# Patient Record
Sex: MALE | Race: WHITE | NOT HISPANIC OR LATINO | Employment: OTHER | URBAN - METROPOLITAN AREA
[De-identification: names, ages, dates, MRNs, and addresses within clinical notes are randomized per-mention and may not be internally consistent; named-entity substitution may affect disease eponyms.]

---

## 2017-12-01 ENCOUNTER — OFFICE VISIT (OUTPATIENT)
Dept: FAMILY MEDICINE CLINIC | Facility: CLINIC | Age: 67
End: 2017-12-01

## 2017-12-01 VITALS
OXYGEN SATURATION: 96 % | DIASTOLIC BLOOD PRESSURE: 64 MMHG | SYSTOLIC BLOOD PRESSURE: 104 MMHG | WEIGHT: 208 LBS | HEART RATE: 55 BPM | TEMPERATURE: 98.2 F

## 2017-12-01 DIAGNOSIS — E78.79 FAMILIAL HYPERCHOLANEMIA: Primary | ICD-10-CM

## 2017-12-01 DIAGNOSIS — F51.01 PRIMARY INSOMNIA: ICD-10-CM

## 2017-12-01 DIAGNOSIS — E03.9 ADULT HYPOTHYROIDISM: ICD-10-CM

## 2017-12-01 PROBLEM — E78.00 HYPERCHOLESTEROLEMIA: Status: ACTIVE | Noted: 2017-12-01

## 2017-12-01 PROCEDURE — 99204 OFFICE O/P NEW MOD 45 MIN: CPT | Performed by: FAMILY MEDICINE

## 2017-12-01 RX ORDER — LEVOTHYROXINE SODIUM 0.15 MG/1
150 TABLET ORAL DAILY
COMMUNITY
End: 2018-07-06 | Stop reason: SDUPTHER

## 2017-12-01 RX ORDER — ZOLPIDEM TARTRATE 5 MG/1
TABLET ORAL
Qty: 60 TABLET | Refills: 2 | Status: SHIPPED | OUTPATIENT
Start: 2017-12-01 | End: 2018-10-09

## 2017-12-01 RX ORDER — SIMVASTATIN 20 MG
20 TABLET ORAL NIGHTLY
COMMUNITY
End: 2018-07-26 | Stop reason: SDUPTHER

## 2017-12-01 RX ORDER — ZOLPIDEM TARTRATE 10 MG/1
10 TABLET ORAL NIGHTLY PRN
COMMUNITY
End: 2017-12-01 | Stop reason: SDUPTHER

## 2017-12-01 RX ORDER — ZOLPIDEM TARTRATE 10 MG/1
TABLET ORAL
Qty: 30 TABLET | Refills: 2 | Status: SHIPPED | OUTPATIENT
Start: 2017-12-01 | End: 2017-12-01

## 2017-12-01 RX ORDER — ASPIRIN 81 MG/1
81 TABLET ORAL DAILY
COMMUNITY
End: 2020-12-24

## 2017-12-01 NOTE — PROGRESS NOTES
"Subjective   Juan Lima is a 67 y.o. male.     Chief Complaint   Patient presents with   • Establish Care     new pt    • Familial Hypercholesterolemia   • Graves' Disease        History of Present Illness    New patient.  Here to get established.    Moved from [place].  Family in the area.    Probable familial hypercholesterolemia.  Patient does not really remember his previous cholesterol numbers.  He's been taking simvastatin 20 mg by mouth daily at bedtime for some time now, years, with no myopathy side effects or other concerns.    Hypothyroidism.  Previous Graves' disease.  Partial thyroidectomy.  Then had a reactive ablation.  This was in the 1970s.  He states he's been on a very stable dose of 150 mg of levothyroxine for number of years.  No hyper or hypothyroid symptoms.    Insomnia.  Long-standing.  He wakes up about 3 times a night.  If he does not take 10 mg of Ambien, he wakes up every hour.  He states he's been taking Ambien for number of years.  Prescribed initially by his previous family doctor in [place].  Minimal alcohol use.  No depression or anhedonia.  He gets some exercise.  No known sleep apnea.  He states the Ambien makes him \"foggy in the head\" in the morning hours sometimes.      The following portions of the patient's history were reviewed and updated as appropriate: allergies, current medications, past family history, past medical history, past social history, past surgical history and problem list.          Review of Systems   Constitutional: Negative.    Respiratory: Negative.    Cardiovascular: Negative.    Musculoskeletal: Negative.    Neurological: Negative.    Psychiatric/Behavioral: Positive for sleep disturbance. Negative for dysphoric mood. The patient is not nervous/anxious.        Objective   Blood pressure 104/64, pulse 55, temperature 98.2 °F (36.8 °C), temperature source Oral, weight 208 lb (94.3 kg), SpO2 96 %.  Physical Exam   Constitutional: He appears well-developed " and well-nourished. No distress.   Neck: No thyromegaly present.   Cardiovascular: Normal rate, regular rhythm, normal heart sounds and intact distal pulses.    Pulmonary/Chest: Effort normal and breath sounds normal.   Musculoskeletal: He exhibits no edema.   Skin: Skin is warm and dry.   Psychiatric: He has a normal mood and affect. His behavior is normal. Judgment and thought content normal.   Nursing note and vitals reviewed.      Assessment/Plan   Juan was seen today for establish care, familial hypercholesterolemia and graves' disease.    Diagnoses and all orders for this visit:    Familial hypercholanemia    Adult hypothyroidism    Primary insomnia  -     Ambulatory Referral to Sleep Medicine    Other orders  -     Discontinue: zolpidem (AMBIEN) 10 MG tablet; 1 or 2 po qhs as directed as per weaning schedule  -     zolpidem (AMBIEN) 5 MG tablet; 1-2 tabs po qhs as directed according to weaning schedule      Long-standing insomnia with probable Ambien psychological and physiological dependence.  No abuse issues.  Patient is aware I do not recommend long-term Ambien use.  Patient is aware I would not have prescribed her to begin with.  Patient is aware that Ambien and medications like it can cause sedation, memory changes, and increased risk of falling including head injury and hip fracture.  At this time I recommend a weaning schedule.  He is going to take 10 mg a night with exception of 5 mg one day the first week, 2 days a second week, 3 days a third week in etc.  After several weeks he is going to take one half tablet one day of the week, then the next week to one half tablets, etc.  Within 14 days hopefully wean.  I have also recommended a referral to a sleep psychologist to help with this weaning process and also to evaluate for other primary sleep disturbance.    Hypothyroidism.  History of Graves' disease with radioactive ablation and thyroidectomy.  Check TSH prior to next visit.    Familial  hyperlipidemia.  Needs lab work prior to next visit.

## 2017-12-06 ENCOUNTER — RESULTS ENCOUNTER (OUTPATIENT)
Dept: FAMILY MEDICINE CLINIC | Facility: CLINIC | Age: 67
End: 2017-12-06

## 2017-12-06 DIAGNOSIS — E78.79 FAMILIAL HYPERCHOLANEMIA: ICD-10-CM

## 2018-03-30 LAB
ALBUMIN SERPL-MCNC: 4.5 G/DL (ref 3.5–5.2)
ALBUMIN/GLOB SERPL: 2 G/DL
ALP SERPL-CCNC: 81 U/L (ref 39–117)
ALT SERPL-CCNC: 16 U/L (ref 1–41)
AST SERPL-CCNC: 21 U/L (ref 1–40)
BILIRUB SERPL-MCNC: 1 MG/DL (ref 0.1–1.2)
BUN SERPL-MCNC: 15 MG/DL (ref 8–23)
BUN/CREAT SERPL: 13.9 (ref 7–25)
CALCIUM SERPL-MCNC: 9.4 MG/DL (ref 8.6–10.5)
CHLORIDE SERPL-SCNC: 99 MMOL/L (ref 98–107)
CHOLEST SERPL-MCNC: 162 MG/DL (ref 0–200)
CO2 SERPL-SCNC: 26.6 MMOL/L (ref 22–29)
CREAT SERPL-MCNC: 1.08 MG/DL (ref 0.76–1.27)
GFR SERPLBLD CREATININE-BSD FMLA CKD-EPI: 68 ML/MIN/1.73
GFR SERPLBLD CREATININE-BSD FMLA CKD-EPI: 82 ML/MIN/1.73
GLOBULIN SER CALC-MCNC: 2.2 GM/DL
GLUCOSE SERPL-MCNC: 110 MG/DL (ref 65–99)
HDLC SERPL-MCNC: 56 MG/DL (ref 40–60)
LDLC SERPL CALC-MCNC: 91 MG/DL (ref 0–100)
POTASSIUM SERPL-SCNC: 4.3 MMOL/L (ref 3.5–5.2)
PROT SERPL-MCNC: 6.7 G/DL (ref 6–8.5)
SODIUM SERPL-SCNC: 139 MMOL/L (ref 136–145)
TRIGL SERPL-MCNC: 77 MG/DL (ref 0–150)
TSH SERPL DL<=0.005 MIU/L-ACNC: 2.66 MIU/ML (ref 0.27–4.2)
VLDLC SERPL CALC-MCNC: 15.4 MG/DL (ref 5–40)

## 2018-04-01 ENCOUNTER — RESULTS ENCOUNTER (OUTPATIENT)
Dept: FAMILY MEDICINE CLINIC | Facility: CLINIC | Age: 68
End: 2018-04-01

## 2018-04-01 DIAGNOSIS — E03.9 ADULT HYPOTHYROIDISM: ICD-10-CM

## 2018-04-04 ENCOUNTER — OFFICE VISIT (OUTPATIENT)
Dept: FAMILY MEDICINE CLINIC | Facility: CLINIC | Age: 68
End: 2018-04-04

## 2018-04-04 VITALS
HEART RATE: 59 BPM | BODY MASS INDEX: 31.25 KG/M2 | OXYGEN SATURATION: 98 % | DIASTOLIC BLOOD PRESSURE: 66 MMHG | HEIGHT: 70 IN | WEIGHT: 218.3 LBS | TEMPERATURE: 97.9 F | SYSTOLIC BLOOD PRESSURE: 120 MMHG

## 2018-04-04 DIAGNOSIS — R73.01 IMPAIRED FASTING GLUCOSE: ICD-10-CM

## 2018-04-04 DIAGNOSIS — E03.9 ADULT HYPOTHYROIDISM: ICD-10-CM

## 2018-04-04 DIAGNOSIS — F51.01 PRIMARY INSOMNIA: ICD-10-CM

## 2018-04-04 DIAGNOSIS — E78.79 FAMILIAL HYPERCHOLANEMIA: ICD-10-CM

## 2018-04-04 DIAGNOSIS — Z11.59 NEED FOR HEPATITIS C SCREENING TEST: ICD-10-CM

## 2018-04-04 DIAGNOSIS — Z00.00 MEDICARE ANNUAL WELLNESS VISIT, SUBSEQUENT: Primary | ICD-10-CM

## 2018-04-04 DIAGNOSIS — Z12.5 SCREENING PSA (PROSTATE SPECIFIC ANTIGEN): ICD-10-CM

## 2018-04-04 PROCEDURE — 99213 OFFICE O/P EST LOW 20 MIN: CPT | Performed by: FAMILY MEDICINE

## 2018-04-04 PROCEDURE — G0439 PPPS, SUBSEQ VISIT: HCPCS | Performed by: FAMILY MEDICINE

## 2018-04-04 NOTE — PROGRESS NOTES
QUICK REFERENCE INFORMATION:  The ABCs of the Annual Wellness Visit    Subsequent Medicare Wellness Visit    HEALTH RISK ASSESSMENT    1950    Recent Hospitalizations:  No hospitalization(s) within the last year..        Current Medical Providers:  Patient Care Team:  Logan Rosario MD as PCP - General (Family Medicine)        Smoking Status:  History   Smoking Status   • Never Smoker   Smokeless Tobacco   • Never Used       Alcohol Consumption:  History   Alcohol use Not on file       Depression Screen:   PHQ-2/PHQ-9 Depression Screening 4/4/2018   Little interest or pleasure in doing things 0   Feeling down, depressed, or hopeless 0   Total Score 0       Health Habits and Functional and Cognitive Screening:  Functional & Cognitive Status 4/4/2018   Do you have difficulty preparing food and eating? No   Do you have difficulty bathing yourself, getting dressed or grooming yourself? No   Do you have difficulty using the toilet? No   Do you have difficulty moving around from place to place? No   Do you have trouble with steps or getting out of a bed or a chair? No   In the past year have you fallen or experienced a near fall? Yes   Dental Exam Up to date   Eye Exam Up to date   Exercise (times per week) 4 times per week   Current Exercise Activities Include Walking   Do you need help using the phone?  No   Are you deaf or do you have serious difficulty hearing?  Yes   Do you need help with transportation? No   Do you need help shopping? No   Do you need help preparing meals?  No   Do you need help with housework?  No   Do you need help with laundry? No   Do you need help taking your medications? No   Do you need help managing money? No   Do you ever drive or ride in a car without wearing a seat belt? No   Have you felt unusual stress, anger or loneliness in the last month? No   Who do you live with? Spouse   If you need help, do you have trouble finding someone available to you? No   Have you been bothered in  the last four weeks by sexual problems? No   Do you have difficulty concentrating, remembering or making decisions? No           Does the patient have evidence of cognitive impairment? No    Aspirin use counseling: Taking ASA appropriately as indicated      Recent Lab Results:  CMP:  Lab Results   Component Value Date     (H) 03/30/2018    BUN 15 03/30/2018    CREATININE 1.08 03/30/2018    EGFRIFNONA 68 03/30/2018    EGFRIFAFRI 82 03/30/2018    BCR 13.9 03/30/2018     03/30/2018    K 4.3 03/30/2018    CO2 26.6 03/30/2018    CALCIUM 9.4 03/30/2018    PROTENTOTREF 6.7 03/30/2018    ALBUMIN 4.50 03/30/2018    LABGLOBREF 2.2 03/30/2018    LABIL2 2.0 03/30/2018    BILITOT 1.0 03/30/2018    ALKPHOS 81 03/30/2018    AST 21 03/30/2018    ALT 16 03/30/2018     Lipid Panel:  Lab Results   Component Value Date    TRIG 77 03/30/2018    HDL 56 03/30/2018    VLDL 15.4 03/30/2018     HbA1c:       Visual Acuity:  No exam data present    Age-appropriate Screening Schedule:  Refer to the list below for future screening recommendations based on patient's age, sex and/or medical conditions. Orders for these recommended tests are listed in the plan section. The patient has been provided with a written plan.    Health Maintenance   Topic Date Due   • TDAP/TD VACCINES (1 - Tdap) 10/29/1967   • ZOSTER VACCINE  12/01/2017   • PNEUMOCOCCAL VACCINES (65+ LOW/MEDIUM RISK) (2 of 2 - PPSV23) 10/01/2018   • LIPID PANEL  03/30/2019   • COLONOSCOPY  08/17/2027   • INFLUENZA VACCINE  Completed        Immunization History   Administered Date(s) Administered   • Flu Vaccine High Dose PF 65YR+ 10/01/2017   • Pneumococcal Conjugate 13-Valent (PCV13) 10/01/2017         Subjective   History of Present Illness    Juan Lima is a 67 y.o. male who presents for an Subsequent Wellness Visit.    The following portions of the patient's history were reviewed and updated as appropriate: allergies, current medications, past family history, past  "medical history, past social history, past surgical history and problem list.    Outpatient Medications Prior to Visit   Medication Sig Dispense Refill   • aspirin 81 MG EC tablet Take 81 mg by mouth Daily.     • Glucosamine-Chondroitin--200-150 MG tablet Take  by mouth.     • levothyroxine (SYNTHROID, LEVOTHROID) 150 MCG tablet Take 150 mcg by mouth Daily.     • Multiple Vitamins-Minerals (MULTIVITAMIN PO) Take 1 tablet by mouth Daily.     • simvastatin (ZOCOR) 20 MG tablet Take 20 mg by mouth Every Night.     • zolpidem (AMBIEN) 5 MG tablet 1-2 tabs po qhs as directed according to weaning schedule 60 tablet 2   • FIBER, CORN DEXTRIN, PO Take 1 tablet by mouth Daily.       No facility-administered medications prior to visit.        Patient Active Problem List   Diagnosis   • Familial hypercholanemia   • Adult hypothyroidism   • Primary insomnia   • Impaired fasting glucose       Advance Care Planning:  has an advance directive - a copy HAS NOT been provided. Have asked the patient to send this to us to add to record.    Identification of Risk Factors:  Risk factors include: cardiovascular risk.    Review of Systems    Compared to one year ago, the patient feels his physical health is the same.  Compared to one year ago, the patient feels his mental health is the same.    Objective     Physical Exam    Vitals:    04/04/18 0948   BP: 120/66   Pulse: 59   Temp: 97.9 °F (36.6 °C)   TempSrc: Oral   SpO2: 98%   Weight: 99 kg (218 lb 4.8 oz)   Height: 177.8 cm (70\")       Body mass index is 31.32 kg/m².  Discussed the patient's BMI with him. BMI is above normal parameters. Follow-up plan includes:  exercise counseling.    Assessment/Plan   Patient Self-Management and Personalized Health Advice  The patient has been provided with information about: exercise, prevention of cardiac or vascular disease and designing advance directives and preventive services including:   · Advance directive.  · TdaP and Shingrix " recommended.... To retail pharmacy    Visit Diagnoses:    ICD-10-CM ICD-9-CM   1. Medicare annual wellness visit, subsequent Z00.00 V70.0   2. Familial hypercholanemia E78.70 277.89   3. Primary insomnia F51.01 307.42   4. Adult hypothyroidism E03.9 244.9   5. Need for hepatitis C screening test Z11.59 V73.89   6. Screening PSA (prostate specific antigen) Z12.5 V76.44   7. Impaired fasting glucose R73.01 790.21       Orders Placed This Encounter   Procedures   • Hemoglobin A1c     Standing Status:   Future     Standing Expiration Date:   4/5/2019     Scheduling Instructions:      Patient to have lab work drawn before next visit         • Comprehensive Metabolic Panel     Standing Status:   Future     Standing Expiration Date:   4/5/2019     Scheduling Instructions:      Patient to have lab work drawn before next visit         • Lipid Panel     Standing Status:   Future     Standing Expiration Date:   4/5/2019     Scheduling Instructions:      Patient to have lab work drawn before next visit         • Hepatitis C Antibody     Standing Status:   Future     Standing Expiration Date:   4/4/2019   • PSA Screen     Standing Status:   Future     Standing Expiration Date:   4/4/2019   • TSH Rfx On Abnormal To Free T4     Standing Status:   Future     Standing Expiration Date:   4/5/2019       Outpatient Encounter Prescriptions as of 4/4/2018   Medication Sig Dispense Refill   • aspirin 81 MG EC tablet Take 81 mg by mouth Daily.     • Glucosamine-Chondroitin--200-150 MG tablet Take  by mouth.     • Inulin (FIBER CHOICE PO) Take 2 tablets by mouth Daily.     • levothyroxine (SYNTHROID, LEVOTHROID) 150 MCG tablet Take 150 mcg by mouth Daily.     • Multiple Vitamins-Minerals (MULTIVITAMIN PO) Take 1 tablet by mouth Daily.     • simvastatin (ZOCOR) 20 MG tablet Take 20 mg by mouth Every Night.     • zolpidem (AMBIEN) 5 MG tablet 1-2 tabs po qhs as directed according to weaning schedule 60 tablet 2   • [DISCONTINUED]  FIBER, CORN DEXTRIN, PO Take 1 tablet by mouth Daily.       No facility-administered encounter medications on file as of 4/4/2018.        Reviewed use of high risk medication in the elderly: yes  Reviewed for potential of harmful drug interactions in the elderly: yes    Follow Up:  Return in about 6 months (around 10/4/2018) for Recheck.     An After Visit Summary and PPPS with all of these plans were given to the patient.

## 2018-04-04 NOTE — PROGRESS NOTES
"Subjective   Juan Lima is a 67 y.o. male.     Chief Complaint   Patient presents with   • Annual Exam     awv follow up labs   • Arthritis     in hands and feet bilateral        History of Present Illness    Hyperlipidemia follow up. He is taking statin medication without complaint. No myopathy symptoms. He continues simvastatin.  LDL at target less than 100.    Last lipid panel:   Lab Results   Component Value Date    HDL 56 03/30/2018     Lab Results   Component Value Date    LDL 91 03/30/2018     Lab Results   Component Value Date    TRIG 77 03/30/2018     Hypothyroidism followup.  Taking levothyroxine as indicated.  No symptoms of high or low thyroid.  Last TSH as follows:    Lab Results   Component Value Date    TSH 2.66 03/30/2018      Insomnia.  He continues cutting back on the Ambien.  He also continues to follow with the sleep psychologist.    Impaired fasting glucose.  Glucose 110.  Patient states it's been this way in the past.      The following portions of the patient's history were reviewed and updated as appropriate: allergies, current medications, past family history, past medical history, past social history, past surgical history and problem list.          Review of Systems   Constitutional: Negative.    Respiratory: Negative.    Cardiovascular: Negative.    Musculoskeletal: Negative.    Neurological: Negative.    Psychiatric/Behavioral: Negative.        Objective   Blood pressure 120/66, pulse 59, temperature 97.9 °F (36.6 °C), temperature source Oral, height 177.8 cm (70\"), weight 99 kg (218 lb 4.8 oz), SpO2 98 %.  Physical Exam   Constitutional: He appears well-developed and well-nourished. No distress.   No acute distress.  Nontoxic.   HENT:   Right Ear: Tympanic membrane, external ear and ear canal normal.   Left Ear: Tympanic membrane, external ear and ear canal normal.   Nose: Nose normal.   Mouth/Throat: Oropharynx is clear and moist. No oropharyngeal exudate.   Eyes: Conjunctivae are " normal. Right eye exhibits no discharge. Left eye exhibits no discharge. No scleral icterus.   Neck: No thyromegaly present.   Cardiovascular: Normal rate, regular rhythm, normal heart sounds and intact distal pulses.    Pulmonary/Chest: Effort normal and breath sounds normal. No stridor. No respiratory distress. He has no wheezes. He has no rales.   No tachypnea   Abdominal: Soft. Bowel sounds are normal. He exhibits no distension. There is no tenderness.   Genitourinary: Prostate normal.   Musculoskeletal: He exhibits no edema.   Lymphadenopathy:     He has no cervical adenopathy.   Skin: Skin is warm and dry. No rash noted.   Psychiatric: He has a normal mood and affect. His behavior is normal. Judgment and thought content normal.   Nursing note and vitals reviewed.      Assessment/Plan   Juan was seen today for annual exam and arthritis.    Diagnoses and all orders for this visit:    Medicare annual wellness visit, subsequent    Familial hypercholanemia  -     Comprehensive Metabolic Panel; Future  -     Lipid Panel; Future    Primary insomnia    Adult hypothyroidism  -     TSH Rfx On Abnormal To Free T4; Future    Need for hepatitis C screening test  -     Hepatitis C Antibody; Future    Screening PSA (prostate specific antigen)  -     PSA Screen; Future    Impaired fasting glucose  -     Hemoglobin A1c; Future      Hyperlipidemia.  Patient continues his statin without complaint.  Target LDL is reached.  Recheck CMP lipid panel prior to next visit in 6 months.    Primary insomnia.  He continues weaning the Ambien.  He continues with a sleep psychologist.  Next    Hypothyroidism.  Check TSH in the future and adjusting accordingly.  Next    Impaired fasting glucose.  Exercise recommended.  Check A1c prior to next visit

## 2018-04-09 ENCOUNTER — RESULTS ENCOUNTER (OUTPATIENT)
Dept: FAMILY MEDICINE CLINIC | Facility: CLINIC | Age: 68
End: 2018-04-09

## 2018-04-09 DIAGNOSIS — Z11.59 NEED FOR HEPATITIS C SCREENING TEST: ICD-10-CM

## 2018-07-03 ENCOUNTER — RESULTS ENCOUNTER (OUTPATIENT)
Dept: FAMILY MEDICINE CLINIC | Facility: CLINIC | Age: 68
End: 2018-07-03

## 2018-07-03 DIAGNOSIS — E78.79 FAMILIAL HYPERCHOLANEMIA: ICD-10-CM

## 2018-07-03 DIAGNOSIS — R73.01 IMPAIRED FASTING GLUCOSE: ICD-10-CM

## 2018-07-03 DIAGNOSIS — Z12.5 SCREENING PSA (PROSTATE SPECIFIC ANTIGEN): ICD-10-CM

## 2018-07-03 DIAGNOSIS — E03.9 ADULT HYPOTHYROIDISM: ICD-10-CM

## 2018-07-06 RX ORDER — LEVOTHYROXINE SODIUM 0.15 MG/1
150 TABLET ORAL DAILY
Qty: 90 TABLET | Refills: 1 | Status: SHIPPED | OUTPATIENT
Start: 2018-07-06 | End: 2018-10-09 | Stop reason: SDUPTHER

## 2018-07-26 RX ORDER — SIMVASTATIN 20 MG
20 TABLET ORAL NIGHTLY
Qty: 30 TABLET | Refills: 2 | Status: CANCELLED | OUTPATIENT
Start: 2018-07-26

## 2018-07-26 RX ORDER — SIMVASTATIN 20 MG
20 TABLET ORAL NIGHTLY
Qty: 90 TABLET | Refills: 1 | Status: SHIPPED | OUTPATIENT
Start: 2018-07-26 | End: 2018-10-09 | Stop reason: SDUPTHER

## 2018-10-03 LAB
ALBUMIN SERPL-MCNC: 4.2 G/DL (ref 3.5–5.2)
ALBUMIN/GLOB SERPL: 1.8 G/DL
ALP SERPL-CCNC: 74 U/L (ref 39–117)
ALT SERPL-CCNC: 17 U/L (ref 1–41)
AST SERPL-CCNC: 17 U/L (ref 1–40)
BILIRUB SERPL-MCNC: 0.6 MG/DL (ref 0.1–1.2)
BUN SERPL-MCNC: 13 MG/DL (ref 8–23)
BUN/CREAT SERPL: 12.7 (ref 7–25)
CALCIUM SERPL-MCNC: 9.1 MG/DL (ref 8.6–10.5)
CHLORIDE SERPL-SCNC: 102 MMOL/L (ref 98–107)
CHOLEST SERPL-MCNC: 165 MG/DL (ref 0–200)
CO2 SERPL-SCNC: 27.4 MMOL/L (ref 22–29)
CREAT SERPL-MCNC: 1.02 MG/DL (ref 0.76–1.27)
GLOBULIN SER CALC-MCNC: 2.4 GM/DL
GLUCOSE SERPL-MCNC: 109 MG/DL (ref 65–99)
HBA1C MFR BLD: 5.6 % (ref 4.8–5.6)
HCV AB S/CO SERPL IA: <0.1 S/CO RATIO (ref 0–0.9)
HDLC SERPL-MCNC: 52 MG/DL (ref 40–60)
LDLC SERPL CALC-MCNC: 96 MG/DL (ref 0–100)
POTASSIUM SERPL-SCNC: 4.3 MMOL/L (ref 3.5–5.2)
PROT SERPL-MCNC: 6.6 G/DL (ref 6–8.5)
PSA SERPL-MCNC: 1.27 NG/ML (ref 0–4)
SODIUM SERPL-SCNC: 140 MMOL/L (ref 136–145)
TRIGL SERPL-MCNC: 85 MG/DL (ref 0–150)
TSH SERPL DL<=0.005 MIU/L-ACNC: 2.12 MIU/ML (ref 0.27–4.2)
VLDLC SERPL CALC-MCNC: 17 MG/DL (ref 5–40)

## 2018-10-09 ENCOUNTER — OFFICE VISIT (OUTPATIENT)
Dept: FAMILY MEDICINE CLINIC | Facility: CLINIC | Age: 68
End: 2018-10-09

## 2018-10-09 VITALS
BODY MASS INDEX: 31.85 KG/M2 | SYSTOLIC BLOOD PRESSURE: 126 MMHG | WEIGHT: 222.5 LBS | HEIGHT: 70 IN | OXYGEN SATURATION: 97 % | TEMPERATURE: 97 F | DIASTOLIC BLOOD PRESSURE: 74 MMHG | HEART RATE: 58 BPM

## 2018-10-09 DIAGNOSIS — E78.79 FAMILIAL HYPERCHOLANEMIA: Primary | ICD-10-CM

## 2018-10-09 DIAGNOSIS — R73.01 IMPAIRED FASTING GLUCOSE: ICD-10-CM

## 2018-10-09 DIAGNOSIS — E03.9 ADULT HYPOTHYROIDISM: ICD-10-CM

## 2018-10-09 PROCEDURE — 99214 OFFICE O/P EST MOD 30 MIN: CPT | Performed by: FAMILY MEDICINE

## 2018-10-09 RX ORDER — SIMVASTATIN 20 MG
20 TABLET ORAL NIGHTLY
Qty: 90 TABLET | Refills: 1 | Status: SHIPPED | OUTPATIENT
Start: 2018-10-09 | End: 2018-10-10 | Stop reason: SDUPTHER

## 2018-10-09 RX ORDER — LEVOTHYROXINE SODIUM 0.15 MG/1
150 TABLET ORAL DAILY
Qty: 90 TABLET | Refills: 1 | Status: SHIPPED | OUTPATIENT
Start: 2018-10-09 | End: 2018-10-10 | Stop reason: SDUPTHER

## 2018-10-09 NOTE — PROGRESS NOTES
"Subjective   Juan Lima is a 67 y.o. male.     Hypothyroidism (follow up labs he said that he has been out of the simvastatin for a month) and Hyperlipidemia    History of Present Illness    Hyperlipidemia follow up.  Ran out of his statin about one month ago.  He states he had been off of it for a couple of weeks prior to the lab draw below.  The cholesterol is unchanged.  Reported familial hyperlipidemia.  Father had an MI at a young age.  Father was a smoker.  Patient is a nonsmoker.    Last lipid panel:   Lab Results   Component Value Date    HDL 52 10/02/2018     Lab Results   Component Value Date    LDL 96 10/02/2018     Lab Results   Component Value Date    TRIG 85 10/02/2018     Hypothyroidism followup.  Taking levothyroxine as indicated.  No symptoms of high or low thyroid.  Last TSH as follows:    Lab Results   Component Value Date    TSH 2.12 10/02/2018      Impaired fasting glucose.  Recent glucose 109 fasting.  However A1c normal at 5.6%.    Insomnia.  He is now off of Ambien.  He has been released by the sleep psychologist.    The following portions of the patient's history were reviewed and updated as appropriate: allergies, current medications, past family history, past medical history, past social history, past surgical history and problem list.      Review of Systems   Constitutional: Negative.    Respiratory: Negative.    Cardiovascular: Negative.    Musculoskeletal: Negative.        Objective   Blood pressure 126/74, pulse 58, temperature 97 °F (36.1 °C), temperature source Oral, height 177.8 cm (70\"), weight 101 kg (222 lb 8 oz), SpO2 97 %.  Physical Exam   Constitutional: He appears well-developed and well-nourished. No distress.   Neck: No thyromegaly present.   Cardiovascular: Normal rate, regular rhythm, normal heart sounds and intact distal pulses.    Pulmonary/Chest: Effort normal and breath sounds normal.   Musculoskeletal: He exhibits no edema.   Skin: Skin is warm and dry. "   Psychiatric: He has a normal mood and affect. His behavior is normal. Judgment and thought content normal.   Nursing note and vitals reviewed.      Assessment/Plan   Juan was seen today for hypothyroidism and hyperlipidemia.    Diagnoses and all orders for this visit:    Familial hypercholanemia  -     Lipid Panel; Future    Adult hypothyroidism    Impaired fasting glucose    Other orders  -     levothyroxine (SYNTHROID, LEVOTHROID) 150 MCG tablet; Take 1 tablet by mouth Daily.  -     simvastatin (ZOCOR) 20 MG tablet; Take 1 tablet by mouth Every Night.      Hyperlipidemia.  Possible familial hyperlipidemia.  Patient has been off of simvastatin for about a month.  Recent lipid panel unchanged.  We are repeating the lipid panel in 6 weeks.  We'll then determine 10 year risk of coronary artery disease based on 2013 guidelines.  Hold simvastatin for now.    Hypothyroid is him.  Recent TSH therapeutic.  Continue levothyroxine as is.  Refill given.    Impaired fasting glucose.  No current evidence of diabetes.    Follow-up in 6 months for recheck and wellness visit

## 2018-10-10 RX ORDER — LEVOTHYROXINE SODIUM 0.15 MG/1
150 TABLET ORAL DAILY
Qty: 90 TABLET | Refills: 1 | Status: SHIPPED | OUTPATIENT
Start: 2018-10-10 | End: 2019-02-18 | Stop reason: SDUPTHER

## 2018-10-10 RX ORDER — SIMVASTATIN 20 MG
20 TABLET ORAL NIGHTLY
Qty: 90 TABLET | Refills: 1 | Status: SHIPPED | OUTPATIENT
Start: 2018-10-10 | End: 2019-04-09

## 2018-11-09 ENCOUNTER — RESULTS ENCOUNTER (OUTPATIENT)
Dept: FAMILY MEDICINE CLINIC | Facility: CLINIC | Age: 68
End: 2018-11-09

## 2018-11-09 DIAGNOSIS — E78.79 FAMILIAL HYPERCHOLANEMIA: ICD-10-CM

## 2018-11-20 LAB
CHOLEST SERPL-MCNC: 180 MG/DL (ref 0–200)
HDLC SERPL-MCNC: 55 MG/DL (ref 40–60)
LDLC SERPL CALC-MCNC: 105 MG/DL (ref 0–100)
TRIGL SERPL-MCNC: 99 MG/DL (ref 0–150)
VLDLC SERPL CALC-MCNC: 19.8 MG/DL (ref 5–40)

## 2018-11-21 NOTE — PROGRESS NOTES
According to our notes we stopped the simvastatin about 6 weeks ago.  The repeat cholesterol is essentially unchanged.  Overall looks good.  Relatively high HDL, relatively low LDL.  At this time I don't see a need for cholesterol medication.

## 2019-02-18 RX ORDER — LEVOTHYROXINE SODIUM 0.15 MG/1
150 TABLET ORAL DAILY
Qty: 90 TABLET | Refills: 1 | Status: SHIPPED | OUTPATIENT
Start: 2019-02-18 | End: 2019-07-10 | Stop reason: SDUPTHER

## 2019-03-29 DIAGNOSIS — E78.79 FAMILIAL HYPERCHOLANEMIA: ICD-10-CM

## 2019-03-29 DIAGNOSIS — Z12.5 SCREENING FOR PROSTATE CANCER: ICD-10-CM

## 2019-03-29 DIAGNOSIS — R73.01 IMPAIRED FASTING GLUCOSE: ICD-10-CM

## 2019-03-29 DIAGNOSIS — E03.9 ADULT HYPOTHYROIDISM: Primary | ICD-10-CM

## 2019-04-03 LAB
ALBUMIN SERPL-MCNC: 4.5 G/DL (ref 3.5–5.2)
ALBUMIN/GLOB SERPL: 2 G/DL
ALP SERPL-CCNC: 79 U/L (ref 39–117)
ALT SERPL-CCNC: 18 U/L (ref 1–41)
AST SERPL-CCNC: 19 U/L (ref 1–40)
BILIRUB SERPL-MCNC: 0.7 MG/DL (ref 0.2–1.2)
BUN SERPL-MCNC: 17 MG/DL (ref 8–23)
BUN/CREAT SERPL: 14.8 (ref 7–25)
CALCIUM SERPL-MCNC: 9.2 MG/DL (ref 8.6–10.5)
CHLORIDE SERPL-SCNC: 102 MMOL/L (ref 98–107)
CHOLEST SERPL-MCNC: 188 MG/DL (ref 0–200)
CO2 SERPL-SCNC: 27.3 MMOL/L (ref 22–29)
CREAT SERPL-MCNC: 1.15 MG/DL (ref 0.76–1.27)
GLOBULIN SER CALC-MCNC: 2.3 GM/DL
GLUCOSE SERPL-MCNC: 114 MG/DL (ref 65–99)
HBA1C MFR BLD: 5.5 % (ref 4.8–5.6)
HDLC SERPL-MCNC: 55 MG/DL (ref 40–60)
LDLC SERPL CALC-MCNC: 114 MG/DL (ref 0–100)
POTASSIUM SERPL-SCNC: 4.1 MMOL/L (ref 3.5–5.2)
PROT SERPL-MCNC: 6.8 G/DL (ref 6–8.5)
PSA SERPL-MCNC: 1.38 NG/ML (ref 0–4)
SODIUM SERPL-SCNC: 140 MMOL/L (ref 136–145)
TRIGL SERPL-MCNC: 94 MG/DL (ref 0–150)
TSH SERPL DL<=0.005 MIU/L-ACNC: 2.94 MIU/ML (ref 0.27–4.2)
VLDLC SERPL CALC-MCNC: 18.8 MG/DL

## 2019-04-09 ENCOUNTER — OFFICE VISIT (OUTPATIENT)
Dept: FAMILY MEDICINE CLINIC | Facility: CLINIC | Age: 69
End: 2019-04-09

## 2019-04-09 VITALS
BODY MASS INDEX: 32.21 KG/M2 | HEIGHT: 70 IN | SYSTOLIC BLOOD PRESSURE: 147 MMHG | TEMPERATURE: 98 F | DIASTOLIC BLOOD PRESSURE: 87 MMHG | OXYGEN SATURATION: 98 % | HEART RATE: 61 BPM | WEIGHT: 225 LBS

## 2019-04-09 DIAGNOSIS — R73.01 IMPAIRED FASTING GLUCOSE: ICD-10-CM

## 2019-04-09 DIAGNOSIS — E78.79 FAMILIAL HYPERCHOLANEMIA: ICD-10-CM

## 2019-04-09 DIAGNOSIS — E03.9 ADULT HYPOTHYROIDISM: ICD-10-CM

## 2019-04-09 DIAGNOSIS — Z00.00 MEDICARE ANNUAL WELLNESS VISIT, SUBSEQUENT: Primary | ICD-10-CM

## 2019-04-09 PROCEDURE — G0439 PPPS, SUBSEQ VISIT: HCPCS | Performed by: FAMILY MEDICINE

## 2019-04-09 PROCEDURE — 99214 OFFICE O/P EST MOD 30 MIN: CPT | Performed by: FAMILY MEDICINE

## 2019-04-09 RX ORDER — SIMVASTATIN 20 MG
20 TABLET ORAL NIGHTLY
Qty: 90 TABLET | Refills: 1 | Status: SHIPPED | OUTPATIENT
Start: 2019-04-09 | End: 2019-07-10 | Stop reason: SDUPTHER

## 2019-04-09 NOTE — PROGRESS NOTES
Subsequent Medicare Wellness Visit   The ABC's of the Annual Wellness Visit    Chief Complaint   Patient presents with   • Medicare Wellness-subsequent     follow up labs   • Skin Problem     has a spot on the right side of his neck and 2 spots on his groin one on both side   • Knee Pain     right knee on and off when walking   • Hip Pain     bilateral on and off when walking       HPI:  Juan Lima, -1950, is a 68 y.o. male who presents for a Subsequent Medicare Wellness Visit.    Recent Hospitalizations:  No hospitalization(s) within the last year..    Current Medical Providers:  Patient Care Team:  Logan Rosario MD as PCP - General (Family Medicine)  Logan Rosario MD as PCP - Claims Attributed    Health Habits and Functional and Cognitive Screening and Depression Screening:  Functional & Cognitive Status 2019   Do you have difficulty preparing food and eating? No   Do you have difficulty bathing yourself, getting dressed or grooming yourself? No   Do you have difficulty using the toilet? No   Do you have difficulty moving around from place to place? No   Do you have trouble with steps or getting out of a bed or a chair? No   In the past year have you fallen or experienced a near fall? No   Current Diet Well Balanced Diet   Dental Exam Up to date   Eye Exam Up to date   Exercise (times per week) 5 times per week   Current Exercise Activities Include Walking   Do you need help using the phone?  No   Are you deaf or do you have serious difficulty hearing?  Yes   Do you need help with transportation? No   Do you need help shopping? No   Do you need help preparing meals?  No   Do you need help with housework?  No   Do you need help with laundry? No   Do you need help taking your medications? No   Do you need help managing money? No   Do you ever drive or ride in a car without wearing a seat belt? No   Have you felt unusual stress, anger or loneliness in the last month? No   Who do you live with?  Spouse   If you need help, do you have trouble finding someone available to you? No   Have you been bothered in the last four weeks by sexual problems? No   Do you have difficulty concentrating, remembering or making decisions? No       Compared to one year ago, the patient feels his physical health is the same and his mental health is the same.    Depression Screen:  PHQ-2/PHQ-9 Depression Screening 4/9/2019   Little interest or pleasure in doing things 0   Feeling down, depressed, or hopeless 0   Total Score 0         Past Medical/Family/Social History:  The following portions of the patient's history were reviewed and updated as appropriate: allergies, current medications, past family history, past medical history, past social history, past surgical history and problem list.    No Known Allergies      Current Outpatient Medications:   •  aspirin 81 MG EC tablet, Take 81 mg by mouth Daily., Disp: , Rfl:   •  CBD (cannabidiol) oral oil, Take  by mouth., Disp: , Rfl:   •  Glucosamine-Chondroitin--200-150 MG tablet, Take  by mouth., Disp: , Rfl:   •  Inulin (FIBER CHOICE PO), Take 2 tablets by mouth Daily., Disp: , Rfl:   •  levothyroxine (SYNTHROID, LEVOTHROID) 150 MCG tablet, TAKE 1 TABLET BY MOUTH  DAILY, Disp: 90 tablet, Rfl: 1  •  Multiple Vitamins-Minerals (MULTIVITAMIN PO), Take 1 tablet by mouth Daily., Disp: , Rfl:   •  simvastatin (ZOCOR) 20 MG tablet, Take 1 tablet by mouth Every Night., Disp: 90 tablet, Rfl: 1    Aspirin use counseling: Taking ASA appropriately as indicated    Current medication list contains no high risk medications.  No harmful drug interactions have been identified.     Family History   Problem Relation Age of Onset   • Osteoporosis Mother    • Breast cancer Mother    • Scoliosis Mother    • Heart disease Father        Social History     Tobacco Use   • Smoking status: Never Smoker   • Smokeless tobacco: Never Used   Substance Use Topics   • Alcohol use: Not on file       Past  "Surgical History:   Procedure Laterality Date   • COLONOSCOPY  10/217   • HERNIA REPAIR     • THYROIDECTOMY     • WRIST SURGERY  05/2017    left wrist        Patient Active Problem List   Diagnosis   • Familial hypercholanemia   • Adult hypothyroidism   • Primary insomnia   • Impaired fasting glucose       Review of Systems    Objective     Vitals:    04/09/19 1305   BP: 147/87   Pulse: 61   Temp: 98 °F (36.7 °C)   TempSrc: Oral   SpO2: 98%   Weight: 102 kg (225 lb)   Height: 177.8 cm (70\")       Patient's Body mass index is 32.28 kg/m². BMI is above normal parameters. Recommendations include: exercise counseling.      No exam data present    The patient has no evidence of cognitve impairment.     Physical Exam    Recent Lab Results:  Lab Results   Component Value Date     (H) 04/02/2019     Lab Results   Component Value Date    TRIG 94 04/02/2019    HDL 55 04/02/2019    VLDL 18.8 04/02/2019       Assessment/Plan   Age-appropriate Screening Schedule:  Refer to the list below for future screening recommendations based on patient's age, sex and/or medical conditions.      Health Maintenance   Topic Date Due   • TDAP/TD VACCINES (1 - Tdap) 10/29/1967   • ZOSTER VACCINE (1 of 2) 10/29/1998   • INFLUENZA VACCINE  08/01/2019   • LIPID PANEL  04/02/2020   • COLONOSCOPY  08/17/2027   • PNEUMOCOCCAL VACCINES (65+ LOW/MEDIUM RISK)  Completed       Medicare Risks and Personalized Health Plan:  Cardiovascular risk;  discussed ACC guidelines Statins and ASA      CMS-Preventive Services Quick Reference  Medicare Preventive Services Addressed:  Annual Wellness Visit (AWV)  Prostate Cancer Screening    Shingrix and hepatitis A vaccinations .... retail pharmacy      Advance Care Planning:  Patient has an advance directive - a copy has been provided and is visible in patient header    There are no diagnoses linked to this encounter.    An After Visit Summary and PPPS with all of these plans were given to the patient.  "     Follow Up:  No Follow-up on file.

## 2019-04-09 NOTE — PROGRESS NOTES
"Subjective   Juan Lima is a 68 y.o. male.     Medicare Wellness-subsequent (follow up labs)    History of Present Illness    Hypertension follow up. Doing well with current medication which he is taking as directed. No known high or low blood pressure episodes. No cardiovascular or neurological symptoms. Today's BP: 147/87.      Hyperlipidemia follow up.  10-year risk of atherosclerotic coronary vascular disease based on ACC guidelines is 21%.  Optimal risk 13%.  He previously was taking statin, simvastatin, without side effects.  Family history of father with heart disease in his 60s.  Last lipid panel:   Lab Results   Component Value Date    HDL 55 04/02/2019     Lab Results   Component Value Date     (H) 04/02/2019     Lab Results   Component Value Date    TRIG 94 04/02/2019     Hypothyroidism followup.  Taking levothyroxine as indicated.  No symptoms of high or low thyroid.  Last TSH as follows:    Lab Results   Component Value Date    TSH 2.940 04/02/2019      Impaired fasting glucose.  Recent A1c normal at 5.5%.  But the fasting glucose was 114.  Patient states he likes sweets.    The following portions of the patient's history were reviewed and updated as appropriate: allergies, current medications, past family history, past medical history, past social history, past surgical history and problem list.      Review of Systems   Constitutional: Negative.    HENT: Negative.    Respiratory: Negative.    Cardiovascular: Negative.    Gastrointestinal: Negative.  Negative for blood in stool.   Endocrine: Negative.    Genitourinary: Negative.  Negative for hematuria.   Musculoskeletal: Negative.    Skin: Negative.    Neurological: Negative.  Negative for headaches.   Psychiatric/Behavioral: Negative.    All other systems reviewed and are negative.      Objective   Blood pressure 147/87, pulse 61, temperature 98 °F (36.7 °C), temperature source Oral, height 177.8 cm (70\"), weight 102 kg (225 lb), SpO2 98 " %.  Physical Exam   Constitutional: He is oriented to person, place, and time. No distress.   HENT:   Head: Normocephalic and atraumatic.   Right Ear: External ear normal.   Left Ear: External ear normal.   Mouth/Throat: Oropharynx is clear and moist.   Eyes: EOM are normal. Pupils are equal, round, and reactive to light.   Neck: Normal range of motion. Neck supple.   Cardiovascular: Normal rate and regular rhythm.   Pulmonary/Chest: Effort normal and breath sounds normal.   Abdominal: Soft. Bowel sounds are normal. He exhibits no distension and no mass. There is no tenderness. There is no guarding. No hernia.   Genitourinary: Prostate normal. Prostate is not enlarged and not tender.   Musculoskeletal: Normal range of motion. He exhibits no edema or tenderness.   Neurological: He is alert and oriented to person, place, and time. He exhibits normal muscle tone. Coordination normal.   Skin: Skin is warm and dry.   Psychiatric: He has a normal mood and affect. His behavior is normal.   Nursing note and vitals reviewed.      Assessment/Plan   Juan was seen today for medicare wellness-subsequent.    Diagnoses and all orders for this visit:    Medicare annual wellness visit, subsequent    Adult hypothyroidism    Familial hypercholanemia    Impaired fasting glucose    Other orders  -     simvastatin (ZOCOR) 20 MG tablet; Take 1 tablet by mouth Every Night.        Hypothyroidism.  Continue levothyroxine.  TSH therapeutic.  Recheck labs prior to next visit.    Hyperlipidemia.  Restarting simvastatin.  10-year risk 21%.  Optimal risk 13%.  Patient has tolerated statins in the past.  Simvastatin 20 mg daily.  Check CMP and lipid panel prior to next visit.  Risk of myopathy and myositis discussed.    Impaired fasting glucose.  Recommend exercise.    Discussed risks and benefits with regards low-dose aspirin.  Discussed recent guidelines suggesting elimination of aspirin for primary prevention.  However given his elevated  cardiovascular risk because of his hyperlipidemia, and also his family history, I would recommend consideration of continuing aspirin 81 mg daily.  He will call with GI upset.  He will avoid climbing ladders.    Follow-up in 6 months.

## 2019-07-10 RX ORDER — SIMVASTATIN 20 MG
20 TABLET ORAL NIGHTLY
Qty: 90 TABLET | Refills: 1 | Status: SHIPPED | OUTPATIENT
Start: 2019-07-10 | End: 2020-02-03

## 2019-07-10 RX ORDER — LEVOTHYROXINE SODIUM 0.15 MG/1
150 TABLET ORAL DAILY
Qty: 90 TABLET | Refills: 1 | Status: SHIPPED | OUTPATIENT
Start: 2019-07-10 | End: 2020-02-03

## 2019-10-01 DIAGNOSIS — R73.01 IMPAIRED FASTING GLUCOSE: ICD-10-CM

## 2019-10-01 DIAGNOSIS — E78.79 FAMILIAL HYPERCHOLANEMIA: Primary | ICD-10-CM

## 2019-10-01 DIAGNOSIS — E03.9 ADULT HYPOTHYROIDISM: ICD-10-CM

## 2019-10-03 LAB
ALBUMIN SERPL-MCNC: 4.2 G/DL (ref 3.5–5.2)
ALBUMIN/GLOB SERPL: 2 G/DL
ALP SERPL-CCNC: 68 U/L (ref 39–117)
ALT SERPL-CCNC: 14 U/L (ref 1–41)
AST SERPL-CCNC: 17 U/L (ref 1–40)
BILIRUB SERPL-MCNC: 0.7 MG/DL (ref 0.2–1.2)
BUN SERPL-MCNC: 10 MG/DL (ref 8–23)
BUN/CREAT SERPL: 10.5 (ref 7–25)
CALCIUM SERPL-MCNC: 8.7 MG/DL (ref 8.6–10.5)
CHLORIDE SERPL-SCNC: 103 MMOL/L (ref 98–107)
CHOLEST SERPL-MCNC: 123 MG/DL (ref 0–200)
CO2 SERPL-SCNC: 27.3 MMOL/L (ref 22–29)
CREAT SERPL-MCNC: 0.95 MG/DL (ref 0.76–1.27)
GLOBULIN SER CALC-MCNC: 2.1 GM/DL
GLUCOSE SERPL-MCNC: 111 MG/DL (ref 65–99)
HBA1C MFR BLD: 5.5 % (ref 4.8–5.6)
HDLC SERPL-MCNC: 58 MG/DL (ref 40–60)
LDLC SERPL CALC-MCNC: 53 MG/DL (ref 0–100)
POTASSIUM SERPL-SCNC: 4.5 MMOL/L (ref 3.5–5.2)
PROT SERPL-MCNC: 6.3 G/DL (ref 6–8.5)
SODIUM SERPL-SCNC: 139 MMOL/L (ref 136–145)
TRIGL SERPL-MCNC: 60 MG/DL (ref 0–150)
TSH SERPL DL<=0.005 MIU/L-ACNC: 0.88 UIU/ML (ref 0.27–4.2)
VLDLC SERPL CALC-MCNC: 12 MG/DL

## 2019-10-10 ENCOUNTER — OFFICE VISIT (OUTPATIENT)
Dept: FAMILY MEDICINE CLINIC | Facility: CLINIC | Age: 69
End: 2019-10-10

## 2019-10-10 VITALS
OXYGEN SATURATION: 98 % | DIASTOLIC BLOOD PRESSURE: 76 MMHG | HEART RATE: 52 BPM | BODY MASS INDEX: 31.09 KG/M2 | HEIGHT: 70 IN | TEMPERATURE: 97.4 F | SYSTOLIC BLOOD PRESSURE: 146 MMHG | WEIGHT: 217.2 LBS

## 2019-10-10 DIAGNOSIS — E03.9 ADULT HYPOTHYROIDISM: ICD-10-CM

## 2019-10-10 DIAGNOSIS — R39.9 LOWER URINARY TRACT SYMPTOMS (LUTS): ICD-10-CM

## 2019-10-10 DIAGNOSIS — E78.79 FAMILIAL HYPERCHOLANEMIA: Primary | ICD-10-CM

## 2019-10-10 DIAGNOSIS — R73.01 IMPAIRED FASTING GLUCOSE: ICD-10-CM

## 2019-10-10 DIAGNOSIS — R03.0 ELEVATED BLOOD PRESSURE READING: ICD-10-CM

## 2019-10-10 PROCEDURE — 99214 OFFICE O/P EST MOD 30 MIN: CPT | Performed by: FAMILY MEDICINE

## 2019-10-10 NOTE — PROGRESS NOTES
Subjective   Juan Lima is a 68 y.o. male.     Follow-up (Hypercholanemia + Discuss Labs )    History of Present Illness      Hyperlipidemia follow up. He is taking statin medication without complaint. No myopathy symptoms.  LDL cholesterol much improved.  Tolerating medication without issue.    Lab Results   Component Value Date    CHLPL 123 10/02/2019    TRIG 60 10/02/2019    HDL 58 10/02/2019    LDL 53 10/02/2019     Elevated blood pressure readings last 2 visits.  140 systolic.  He checks his blood pressure at home fairly regularly.  Runs about 130 up to 135 systolic at home.  With diastolic 70.  No cardiovascular symptoms.  He is lost weight.  Almost 10 pounds.  Through diet and exercise.    Hypothyroidism.  TSH is therapeutic.  No thyroid symptoms.  His TSH is starting to come down with the weight loss.    Lower urinary tract symptoms.  For a number of months.  Frequent urination.  Urinates twice at night but that is unchanged.  He has no trouble starting or finishing his stream.  He feels like it is a full evacuation of urine.  He states he drinks lots of water and drinks a decent amount of coffee.  On road trips up to stop every 45 minutes to urinate.  So will his wife.  Prostate slightly enlarged last visit.  PSA normal.    Impaired fasting glucose.  The A1c is normal.  The fasting glucose is now lower.  Not near diabetic range.  He is losing weight through diet and exercise as above      The following portions of the patient's history were reviewed and updated as appropriate: allergies, current medications, past family history, past medical history, past social history, past surgical history and problem list.      Review of Systems   Constitutional: Negative.    Respiratory: Negative.    Cardiovascular: Negative.    Genitourinary: Positive for frequency.   Musculoskeletal: Negative.        Objective   Blood pressure 146/76, pulse 52, temperature 97.4 °F (36.3 °C), temperature source Oral, height 177.8 cm  "(70\"), weight 98.5 kg (217 lb 3.2 oz), SpO2 98 %.  Physical Exam   Constitutional: He appears well-developed and well-nourished. No distress.   Neck: No thyromegaly present.   Cardiovascular: Normal rate, regular rhythm, normal heart sounds and intact distal pulses.   Pulmonary/Chest: Effort normal and breath sounds normal.   Musculoskeletal: He exhibits no edema.   Skin: Skin is warm and dry.   Psychiatric: He has a normal mood and affect. His behavior is normal. Judgment and thought content normal.   Nursing note and vitals reviewed.      Assessment/Plan   Juan was seen today for follow-up.    Diagnoses and all orders for this visit:    Familial hypercholanemia  -     Comprehensive Metabolic Panel; Future  -     Lipid Panel; Future    Adult hypothyroidism  -     TSH Rfx On Abnormal To Free T4; Future    Impaired fasting glucose    Elevated blood pressure reading    Lower urinary tract symptoms (LUTS)      Hyperlipidemia.  Much improved on simvastatin with no side effects.  Follow-up in 6 months for recheck and Medicare wellness visit    Aspirin use.  He is at elevated risk for cardiovascular disease including a strong family history.  He has had a little bit of bruising.  Today he has a small bruise on his left hand and also a small area on his left foot.  At this point I recommend doing the aspirin every other day.    Hypothyroidism.  TSH is on the low normal side.  We will keep checking.  With his weight loss may have to lower dose.    Impaired fasting glucose.  This time no evidence of diabetes.  We will continue to monitor.    Elevated blood pressure readings.  Averaging about 130s at home.  Keep checking.  With the weight loss things will improve hopefully if not he will need medication.    Lower urinary tract symptoms.  Nuisance level.  I offered tamsulosin.  Patient declined at this time.  We will continue to monitor.  He will call with worsening symptoms.         "

## 2020-01-08 ENCOUNTER — RESULTS ENCOUNTER (OUTPATIENT)
Dept: FAMILY MEDICINE CLINIC | Facility: CLINIC | Age: 70
End: 2020-01-08

## 2020-01-08 DIAGNOSIS — E03.9 ADULT HYPOTHYROIDISM: ICD-10-CM

## 2020-01-08 DIAGNOSIS — E78.79 FAMILIAL HYPERCHOLANEMIA: ICD-10-CM

## 2020-02-03 RX ORDER — LEVOTHYROXINE SODIUM 0.15 MG/1
150 TABLET ORAL DAILY
Qty: 90 TABLET | Refills: 1 | Status: SHIPPED | OUTPATIENT
Start: 2020-02-03 | End: 2020-07-22

## 2020-02-03 RX ORDER — SIMVASTATIN 20 MG
20 TABLET ORAL NIGHTLY
Qty: 90 TABLET | Refills: 1 | Status: SHIPPED | OUTPATIENT
Start: 2020-02-03 | End: 2020-07-22

## 2020-07-22 RX ORDER — SIMVASTATIN 20 MG
TABLET ORAL
Qty: 90 TABLET | Refills: 1 | Status: SHIPPED | OUTPATIENT
Start: 2020-07-22 | End: 2021-02-11

## 2020-07-22 RX ORDER — LEVOTHYROXINE SODIUM 0.15 MG/1
150 TABLET ORAL DAILY
Qty: 90 TABLET | Refills: 1 | Status: SHIPPED | OUTPATIENT
Start: 2020-07-22 | End: 2021-02-11

## 2020-10-21 ENCOUNTER — PATIENT OUTREACH (OUTPATIENT)
Dept: CASE MANAGEMENT | Facility: OTHER | Age: 70
End: 2020-10-21

## 2020-10-21 NOTE — OUTREACH NOTE
Patient Outreach Note    RN-ACJULIET spoke with patient to schedule Medicare AWV. Patient would like to schedule for November as his wife is having surgery in the beginning of Dec and he will be caring for her. Dr. Rosario did not have any available 30 minute visit slots available for AWV for these dates; pt states he will call back to the office at the first of the year and schedule at that time.    Bharti Neff RN  Ambulatory     10/21/2020, 15:25 EDT

## 2020-12-15 ENCOUNTER — LAB REQUISITION (OUTPATIENT)
Dept: LAB | Facility: HOSPITAL | Age: 70
End: 2020-12-15

## 2020-12-15 DIAGNOSIS — Z00.00 ENCOUNTER FOR GENERAL ADULT MEDICAL EXAMINATION WITHOUT ABNORMAL FINDINGS: ICD-10-CM

## 2020-12-15 PROCEDURE — U0004 COV-19 TEST NON-CDC HGH THRU: HCPCS | Performed by: OPHTHALMOLOGY

## 2020-12-16 LAB — SARS-COV-2 RNA RESP QL NAA+PROBE: NOT DETECTED

## 2020-12-21 ENCOUNTER — TELEPHONE (OUTPATIENT)
Dept: FAMILY MEDICINE CLINIC | Facility: CLINIC | Age: 70
End: 2020-12-21

## 2020-12-21 DIAGNOSIS — I48.92 ATRIAL FLUTTER, UNSPECIFIED TYPE (HCC): Primary | ICD-10-CM

## 2020-12-21 NOTE — TELEPHONE ENCOUNTER
LEFT PT VOICEMAIL REGARDING IF HE IS HAVING AN SYMPTOMS AND WHERE HIS EKG WAS DONE. OK PER HIPAA.

## 2020-12-21 NOTE — TELEPHONE ENCOUNTER
PATIENTS WIFE CALLED STATING THAT PATIENT NEEDS A REFERRAL TO A CARDIOLOGIST DUE TO HEART FLUTTERING FROM RECENT ECG TEST.       PATIENT CALL BACK: [phone]

## 2020-12-21 NOTE — TELEPHONE ENCOUNTER
Please find out what is going on.  Is he having any symptoms?  Where his EKG done?  I do not see anything on the chart.  Thank you

## 2020-12-23 ENCOUNTER — OFFICE VISIT (OUTPATIENT)
Dept: FAMILY MEDICINE CLINIC | Facility: CLINIC | Age: 70
End: 2020-12-23

## 2020-12-23 VITALS
HEIGHT: 70 IN | BODY MASS INDEX: 40.09 KG/M2 | DIASTOLIC BLOOD PRESSURE: 82 MMHG | TEMPERATURE: 97.1 F | OXYGEN SATURATION: 97 % | SYSTOLIC BLOOD PRESSURE: 130 MMHG | WEIGHT: 280 LBS | HEART RATE: 89 BPM

## 2020-12-23 DIAGNOSIS — I48.92 ATRIAL FLUTTER, UNSPECIFIED TYPE (HCC): Primary | ICD-10-CM

## 2020-12-23 DIAGNOSIS — E78.79 FAMILIAL HYPERCHOLANEMIA: ICD-10-CM

## 2020-12-23 DIAGNOSIS — E03.9 ADULT HYPOTHYROIDISM: ICD-10-CM

## 2020-12-23 LAB
ALBUMIN SERPL-MCNC: 4.3 G/DL (ref 3.5–5.2)
ALBUMIN/GLOB SERPL: 1.5 G/DL
ALP SERPL-CCNC: 82 U/L (ref 39–117)
ALT SERPL-CCNC: 19 U/L (ref 1–41)
AST SERPL-CCNC: 21 U/L (ref 1–40)
BASOPHILS # BLD AUTO: 0.03 10*3/MM3 (ref 0–0.2)
BASOPHILS NFR BLD AUTO: 0.5 % (ref 0–1.5)
BILIRUB SERPL-MCNC: 0.6 MG/DL (ref 0–1.2)
BUN SERPL-MCNC: 12 MG/DL (ref 8–23)
BUN/CREAT SERPL: 11.8 (ref 7–25)
CALCIUM SERPL-MCNC: 8.9 MG/DL (ref 8.6–10.5)
CHLORIDE SERPL-SCNC: 100 MMOL/L (ref 98–107)
CHOLEST SERPL-MCNC: 129 MG/DL (ref 0–200)
CO2 SERPL-SCNC: 29.1 MMOL/L (ref 22–29)
CREAT SERPL-MCNC: 1.02 MG/DL (ref 0.76–1.27)
EOSINOPHIL # BLD AUTO: 0.11 10*3/MM3 (ref 0–0.4)
EOSINOPHIL NFR BLD AUTO: 1.7 % (ref 0.3–6.2)
ERYTHROCYTE [DISTWIDTH] IN BLOOD BY AUTOMATED COUNT: 12 % (ref 12.3–15.4)
GLOBULIN SER CALC-MCNC: 2.9 GM/DL
GLUCOSE SERPL-MCNC: 110 MG/DL (ref 65–99)
HCT VFR BLD AUTO: 47.3 % (ref 37.5–51)
HDLC SERPL-MCNC: 68 MG/DL (ref 40–60)
HGB BLD-MCNC: 16.2 G/DL (ref 13–17.7)
IMM GRANULOCYTES # BLD AUTO: 0.01 10*3/MM3 (ref 0–0.05)
IMM GRANULOCYTES NFR BLD AUTO: 0.2 % (ref 0–0.5)
LDLC SERPL CALC-MCNC: 48 MG/DL (ref 0–100)
LYMPHOCYTES # BLD AUTO: 1.46 10*3/MM3 (ref 0.7–3.1)
LYMPHOCYTES NFR BLD AUTO: 22.4 % (ref 19.6–45.3)
MCH RBC QN AUTO: 32.7 PG (ref 26.6–33)
MCHC RBC AUTO-ENTMCNC: 34.2 G/DL (ref 31.5–35.7)
MCV RBC AUTO: 95.6 FL (ref 79–97)
MONOCYTES # BLD AUTO: 0.77 10*3/MM3 (ref 0.1–0.9)
MONOCYTES NFR BLD AUTO: 11.8 % (ref 5–12)
NEUTROPHILS # BLD AUTO: 4.15 10*3/MM3 (ref 1.7–7)
NEUTROPHILS NFR BLD AUTO: 63.4 % (ref 42.7–76)
NRBC BLD AUTO-RTO: 0 /100 WBC (ref 0–0.2)
PLATELET # BLD AUTO: 178 10*3/MM3 (ref 140–450)
POTASSIUM SERPL-SCNC: 4.8 MMOL/L (ref 3.5–5.2)
PROT SERPL-MCNC: 7.2 G/DL (ref 6–8.5)
RBC # BLD AUTO: 4.95 10*6/MM3 (ref 4.14–5.8)
SODIUM SERPL-SCNC: 137 MMOL/L (ref 136–145)
T4 FREE SERPL-MCNC: 1.44 NG/DL (ref 0.93–1.7)
TRIGL SERPL-MCNC: 61 MG/DL (ref 0–150)
TSH SERPL DL<=0.005 MIU/L-ACNC: 6 UIU/ML (ref 0.27–4.2)
VLDLC SERPL CALC-MCNC: 13 MG/DL (ref 5–40)
WBC # BLD AUTO: 6.53 10*3/MM3 (ref 3.4–10.8)

## 2020-12-23 PROCEDURE — 93000 ELECTROCARDIOGRAM COMPLETE: CPT | Performed by: FAMILY MEDICINE

## 2020-12-23 PROCEDURE — 99214 OFFICE O/P EST MOD 30 MIN: CPT | Performed by: FAMILY MEDICINE

## 2020-12-23 RX ORDER — DICLOFENAC SODIUM 1 MG/ML
SOLUTION/ DROPS OPHTHALMIC
COMMUNITY
Start: 2020-11-19 | End: 2021-04-20

## 2020-12-23 RX ORDER — BESIFLOXACIN 6 MG/ML
SUSPENSION OPHTHALMIC
COMMUNITY
Start: 2020-11-19 | End: 2021-04-20

## 2020-12-23 RX ORDER — TRIPROLIDINE/PSEUDOEPHEDRINE 2.5MG-60MG
TABLET ORAL
COMMUNITY
Start: 2020-11-19 | End: 2021-04-20

## 2020-12-23 NOTE — PROGRESS NOTES
"Jos Lima is a 70 y.o. male.     Chief Complaint   Patient presents with   • cardio referral        History of Present Illness    70-year-old male with a history of hypothyroidism, hyperlipidemia presents with asymptomatic atrial flutter picked up on a preoperative evaluation for recent cataract surgery that reportedly went well.  He has been off his 81 mg of aspirin for the last week or so prior to the surgery.  The EKG also demonstrated questionable old inferior wall MI.  Patient denies chest pain or chest pressure.  He states has had some GERD symptoms recently over the last number of weeks or months.  However no severe discomfort.  He has had no exertional intolerance or exercise fatigue.  He has had maybe some overall fatigue.  He thinks his heart rate is a little bit higher.  But otherwise no specific symptoms and has been doing his normal activities.  We have no previous EKGs on file.  His blood pressure is running borderline high last check which was about a year ago.  He missed his last appoint because of COVID-19.  He does check his blood pressure periodically at home.  Averaging about 120s over 70s.      The following portions of the patient's history were reviewed and updated as appropriate: allergies, current medications, past family history, past medical history, past social history, past surgical history and problem list.          Review of Systems   Constitutional: Positive for fatigue.   Respiratory: Negative.    Cardiovascular: Negative.  Negative for palpitations.   Psychiatric/Behavioral: Negative.        Objective   Blood pressure 130/82, pulse 89, temperature 97.1 °F (36.2 °C), temperature source Temporal, height 177.8 cm (70\"), weight 127 kg (280 lb), SpO2 97 %.    Physical Exam  Vitals signs and nursing note reviewed.   Constitutional:       General: He is not in acute distress.     Appearance: He is well-developed.   Neck:      Thyroid: No thyromegaly.   Cardiovascular:      " Rate and Rhythm: Normal rate and regular rhythm.      Heart sounds: Normal heart sounds.      Comments: Regular rate and rhythm today.  On examination.  Pulmonary:      Effort: Pulmonary effort is normal.      Breath sounds: Normal breath sounds.   Skin:     General: Skin is warm and dry.   Psychiatric:         Behavior: Behavior normal.         Thought Content: Thought content normal.         Judgment: Judgment normal.         ECG 12 Lead    Date/Time: 12/23/2020 8:37 AM  Performed by: Logan Rosario MD  Authorized by: Logan Rosario MD   Comparison: compared with previous ECG   Similar to previous ECG  Rhythm: atrial flutter  Rate: normal  Conduction: conduction normal  Q waves: II, III and aVF    QRS axis: normal    Clinical impression: abnormal EKG  Comments: Atrial flutter.  Heart rate 75.  There are Q waves inferiorly suggestive of age indeterminant MI.  Abnormal EKG.          Assessment/Plan   Diagnoses and all orders for this visit:    1. Atrial flutter, unspecified type (CMS/HCC) (Primary)  -     CBC & Differential  -     Comprehensive Metabolic Panel  -     Lipid Panel  -     TSH Rfx On Abnormal To Free T4    2. Adult hypothyroidism  -     TSH Rfx On Abnormal To Free T4    3. Familial hypercholanemia  -     Lipid Panel    Other orders  -     ECG 12 Lead      Newly diagnosed relatively asymptomatic atrial flutter.  Rate controlled without medication.  [id number] score is 1.  Restart aspirin.  Holding off starting a beta-blocker.  Seeing cardiology tomorrow.      There was a question of hypertension last year but his blood pressure at home is running fairly normal.  I want to continue to check his blood pressure.  He is going to be seeing the cardiologist tomorrow.  I want him to restart his aspirin.  He should postpone his upcoming cataract surgery on the other eye.  I am holding off a beta-blocker at this time, this is rate controlled.  With regards to the Q waves inferiorly, may very well need a  stress test.  He certainly will need a echocardiogram.  On examination today I do not hear any obvious murmurs but cannot rule out valvular heart disease as a cause of the atrial flutter.  He has known hypothyroidism.  I am going to be rechecking his TSH today along with other lab work.  Patient understands the potential long-term risk of atrial flutter including increased risk of stroke.  Holding off anticoagulation at this time because of his current risk factors.  However he may still need to be on anticoagulation per cardiology consultation.  As above he is going to restart his antiplatelet therapy with aspirin 81 mg daily.  He will call with concerns.    He is going to send me blood pressure readings within about a month.  I will see him in 6 to 8 weeks for annual Medicare wellness visit and recheck.

## 2020-12-24 ENCOUNTER — OFFICE VISIT (OUTPATIENT)
Dept: CARDIOLOGY | Facility: CLINIC | Age: 70
End: 2020-12-24

## 2020-12-24 VITALS
BODY MASS INDEX: 33.07 KG/M2 | RESPIRATION RATE: 16 BRPM | WEIGHT: 231 LBS | HEART RATE: 82 BPM | OXYGEN SATURATION: 98 % | DIASTOLIC BLOOD PRESSURE: 80 MMHG | HEIGHT: 70 IN | SYSTOLIC BLOOD PRESSURE: 112 MMHG

## 2020-12-24 DIAGNOSIS — R00.2 PALPITATIONS: ICD-10-CM

## 2020-12-24 DIAGNOSIS — E78.00 HYPERCHOLESTEROLEMIA: ICD-10-CM

## 2020-12-24 DIAGNOSIS — I48.92 ATRIAL FLUTTER WITH CONTROLLED RESPONSE (HCC): Primary | ICD-10-CM

## 2020-12-24 DIAGNOSIS — E66.9 OBESITY (BMI 30.0-34.9): ICD-10-CM

## 2020-12-24 PROBLEM — E66.811 OBESITY (BMI 30.0-34.9): Status: ACTIVE | Noted: 2020-12-24

## 2020-12-24 PROCEDURE — 99204 OFFICE O/P NEW MOD 45 MIN: CPT | Performed by: INTERNAL MEDICINE

## 2020-12-24 RX ORDER — ASPIRIN 81 MG/1
81 TABLET ORAL DAILY
Qty: 90 TABLET | Refills: 3 | Status: SHIPPED | OUTPATIENT
Start: 2020-12-24

## 2020-12-24 RX ORDER — BILBERRY FRUIT 1000 MG
CAPSULE ORAL
COMMUNITY

## 2020-12-24 NOTE — PROGRESS NOTES
Lab work  overall is acceptable.  The thyroid dose may need to be increased.  We will discuss more next visit.  But not adding to the atrial flutter risk currently.  We will discuss this and more at upcoming visit.

## 2020-12-24 NOTE — PROGRESS NOTES
PATIENTINFORMATION    Date of Office Visit: 2020  Encounter Provider: John Portillo MD  Place of Service: [facility] CARDIOLOGY  Patient Name: Juan Lima  : 1950    Subjective:     Encounter Date:2020      Patient ID: Juan Lima is a 70 y.o. male.    Chief Complaint   Patient presents with   • Atrial Flutter     HPI  Mr. Lima is a 70 years old man with past medical history of hyperlipidemia referred to cardiology clinic for evaluation of atrial flutter incidentally diagnosed on routine EKG done as part of preop eval for cataract extraction.  Patient believes for the past 6 months or so he has been feeling a little bit more short winded than usual when he exerts himself or exercises but not significantly.  He still walks a couple of miles at least 3 times a week without any significant chest pain or limiting shortness of breath.  He has also been more aware of his heart beating at night than usual but not during daytime.  Otherwise he denied any significant chest discomfort, presyncope or syncope, orthopnea, PND or extremity swelling.  He denied prior diagnosis of hypertension, diabetes, CVA, or any atherosclerotic disease.  He takes a statin for hyperlipidemia.    He denied any prior history of tobacco use but reports his his dad passed away from heart disease at age 44.  ROS   All systems reviewed and negative except as noted in HPI    Past Medical History:   Diagnosis Date   • Familial hypercholesteremia    • Graves disease        Past Surgical History:   Procedure Laterality Date   • COLONOSCOPY  10/217   • EYE SURGERY Left 2020   • HERNIA REPAIR     • THYROIDECTOMY     • WRIST SURGERY  2017    left wrist        Social History     Socioeconomic History   • Marital status:      Spouse name: Not on file   • Number of children: Not on file   • Years of education: Not on file   • Highest education level: Not on file   Tobacco Use   •  "Smoking status: Never Smoker   • Smokeless tobacco: Never Used   Substance and Sexual Activity   • Drug use: No       Family History   Problem Relation Age of Onset   • Osteoporosis Mother    • Breast cancer Mother    • Scoliosis Mother    • Heart disease Father          Procedures       Objective:     /80   Pulse 82   Resp 16   Ht 177.8 cm (70\")   Wt 105 kg (231 lb)   SpO2 98%   BMI 33.15 kg/m²  Body mass index is 33.15 kg/m².     Constitutional:       General: Not in acute distress.     Appearance: Well-developed. Not diaphoretic.   Eyes:      Pupils: Pupils are equal, round, and reactive to light.   HENT:      Head: Normocephalic and atraumatic.   Neck:      Musculoskeletal: Normal range of motion and neck supple.      Thyroid: No thyromegaly.   Pulmonary:      Effort: Pulmonary effort is normal. No respiratory distress.      Breath sounds: Normal breath sounds. No wheezing. No rales.   Chest:      Chest wall: Not tender to palpatation.   Cardiovascular:      Normal rate. Regular rhythm.      No gallop.   Pulses:     Intact distal pulses.   Edema:     Peripheral edema absent.   Abdominal:      General: Bowel sounds are normal. There is no distension.      Palpations: Abdomen is soft.      Tenderness: There is no guarding.   Musculoskeletal: Normal range of motion.         General: No deformity.   Skin:     General: Skin is warm and dry.      Findings: No rash.   Neurological:      Mental Status: Alert and oriented to person, place, and time.      Cranial Nerves: No cranial nerve deficit.      Deep Tendon Reflexes: Reflexes are normal and symmetric.   Psychiatric:         Judgment: Judgment normal.         Review Of Data: I have reviewed documentations from PCPs office visit.      Assessment/Plan:         Atrial flutter with controlled response -typical counterclockwise atrial flutter on EKG done yesterday.    Hypercholesterolemia-recent lipid panel is at goal    Obesity (BMI 30.0-34.9)    Patient with " newly diagnosed atrial flutter that is not very symptomatic.  Rate is very well controlled.  [id number] score is 1 because of age older than 65.  I have discussed with patient diagnosis and plan of care.  No need for AV josefina blockers this point.  I will start him on baby aspirin every day.  Also get echocardiogram.  Patient encouraged to continue exercising and monitor his heart rate and blood pressure at home and report with any significant symptom changes.  Return to clinic in 3 months.    Diagnosis and plan of care discussed with patient and verbalized understanding.           John Portillo MD  12/24/20  11:11 EST

## 2020-12-31 ENCOUNTER — LAB REQUISITION (OUTPATIENT)
Dept: LAB | Facility: HOSPITAL | Age: 70
End: 2020-12-31

## 2020-12-31 DIAGNOSIS — Z00.00 ENCOUNTER FOR GENERAL ADULT MEDICAL EXAMINATION WITHOUT ABNORMAL FINDINGS: ICD-10-CM

## 2020-12-31 LAB — SARS-COV-2 ORF1AB RESP QL NAA+PROBE: NOT DETECTED

## 2020-12-31 PROCEDURE — U0004 COV-19 TEST NON-CDC HGH THRU: HCPCS | Performed by: OPHTHALMOLOGY

## 2021-01-15 ENCOUNTER — HOSPITAL ENCOUNTER (OUTPATIENT)
Dept: CARDIOLOGY | Facility: HOSPITAL | Age: 71
Discharge: HOME | End: 2021-01-15
Admitting: INTERNAL MEDICINE

## 2021-01-15 VITALS
SYSTOLIC BLOOD PRESSURE: 120 MMHG | HEIGHT: 70 IN | DIASTOLIC BLOOD PRESSURE: 80 MMHG | WEIGHT: 231 LBS | HEART RATE: 73 BPM | OXYGEN SATURATION: 98 % | BODY MASS INDEX: 33.07 KG/M2

## 2021-01-15 DIAGNOSIS — R00.2 PALPITATIONS: ICD-10-CM

## 2021-01-15 PROCEDURE — 93306 TTE W/DOPPLER COMPLETE: CPT

## 2021-01-15 PROCEDURE — 25010000002 PERFLUTREN (DEFINITY) 8.476 MG IN SODIUM CHLORIDE (PF) 0.9 % 10 ML INJECTION: Performed by: INTERNAL MEDICINE

## 2021-01-15 PROCEDURE — 93306 TTE W/DOPPLER COMPLETE: CPT | Performed by: INTERNAL MEDICINE

## 2021-01-15 RX ADMIN — PERFLUTREN 1.5 ML: 6.52 INJECTION, SUSPENSION INTRAVENOUS at 07:59

## 2021-01-15 NOTE — PROGRESS NOTES
Please notify patient that his echocardiogram does not show any significant abnormality and his heart function is normal.  Let me know if he has any questions    Thank you

## 2021-01-18 LAB
AORTIC ARCH: 2.4 CM
ASCENDING AORTA: 3.3 CM
BH CV ECHO MEAS - ACS: 2 CM
BH CV ECHO MEAS - AO MAX PG (FULL): 1.3 MMHG
BH CV ECHO MEAS - AO MAX PG: 3.8 MMHG
BH CV ECHO MEAS - AO MEAN PG (FULL): 1.1 MMHG
BH CV ECHO MEAS - AO MEAN PG: 2.5 MMHG
BH CV ECHO MEAS - AO ROOT AREA (BSA CORRECTED): 1.4
BH CV ECHO MEAS - AO ROOT AREA: 7.1 CM^2
BH CV ECHO MEAS - AO ROOT DIAM: 3 CM
BH CV ECHO MEAS - AO V2 MAX: 97.7 CM/SEC
BH CV ECHO MEAS - AO V2 MEAN: 75.1 CM/SEC
BH CV ECHO MEAS - AO V2 VTI: 19.8 CM
BH CV ECHO MEAS - ASC AORTA: 3.3 CM
BH CV ECHO MEAS - AVA(I,A): 2.3 CM^2
BH CV ECHO MEAS - AVA(I,D): 2.3 CM^2
BH CV ECHO MEAS - AVA(V,A): 2.5 CM^2
BH CV ECHO MEAS - AVA(V,D): 2.5 CM^2
BH CV ECHO MEAS - BSA(HAYCOCK): 2.3 M^2
BH CV ECHO MEAS - BSA: 2.2 M^2
BH CV ECHO MEAS - BZI_BMI: 33.1 KILOGRAMS/M^2
BH CV ECHO MEAS - BZI_METRIC_HEIGHT: 177.8 CM
BH CV ECHO MEAS - BZI_METRIC_WEIGHT: 104.8 KG
BH CV ECHO MEAS - EDV(MOD-SP2): 62 ML
BH CV ECHO MEAS - EDV(MOD-SP4): 92 ML
BH CV ECHO MEAS - EDV(TEICH): 123.9 ML
BH CV ECHO MEAS - EF(CUBED): 61.9 %
BH CV ECHO MEAS - EF(MOD-BP): 51 %
BH CV ECHO MEAS - EF(MOD-SP2): 50 %
BH CV ECHO MEAS - EF(MOD-SP4): 53.3 %
BH CV ECHO MEAS - EF(TEICH): 53.2 %
BH CV ECHO MEAS - ESV(MOD-SP2): 31 ML
BH CV ECHO MEAS - ESV(MOD-SP4): 43 ML
BH CV ECHO MEAS - ESV(TEICH): 58 ML
BH CV ECHO MEAS - FS: 27.5 %
BH CV ECHO MEAS - IVS/LVPW: 0.89
BH CV ECHO MEAS - IVSD: 1.1 CM
BH CV ECHO MEAS - LAT PEAK E' VEL: 10.3 CM/SEC
BH CV ECHO MEAS - LV DIASTOLIC VOL/BSA (35-75): 41.5 ML/M^2
BH CV ECHO MEAS - LV MASS(C)D: 226.5 GRAMS
BH CV ECHO MEAS - LV MASS(C)DI: 102.1 GRAMS/M^2
BH CV ECHO MEAS - LV MAX PG: 2.5 MMHG
BH CV ECHO MEAS - LV MEAN PG: 1.4 MMHG
BH CV ECHO MEAS - LV SYSTOLIC VOL/BSA (12-30): 19.4 ML/M^2
BH CV ECHO MEAS - LV V1 MAX: 78.8 CM/SEC
BH CV ECHO MEAS - LV V1 MEAN: 55.1 CM/SEC
BH CV ECHO MEAS - LV V1 VTI: 15 CM
BH CV ECHO MEAS - LVIDD: 5.1 CM
BH CV ECHO MEAS - LVIDS: 3.7 CM
BH CV ECHO MEAS - LVLD AP2: 6.6 CM
BH CV ECHO MEAS - LVLD AP4: 6.6 CM
BH CV ECHO MEAS - LVLS AP2: 6.1 CM
BH CV ECHO MEAS - LVLS AP4: 5.9 CM
BH CV ECHO MEAS - LVOT AREA (M): 3.1 CM^2
BH CV ECHO MEAS - LVOT AREA: 3.1 CM^2
BH CV ECHO MEAS - LVOT DIAM: 2 CM
BH CV ECHO MEAS - LVPWD: 1.2 CM
BH CV ECHO MEAS - MED PEAK E' VEL: 8.7 CM/SEC
BH CV ECHO MEAS - MR MAX PG: 55 MMHG
BH CV ECHO MEAS - MR MAX VEL: 370.9 CM/SEC
BH CV ECHO MEAS - MV A DUR: 0.1 SEC
BH CV ECHO MEAS - MV A MAX VEL: 36.1 CM/SEC
BH CV ECHO MEAS - MV DEC SLOPE: 624.3 CM/SEC^2
BH CV ECHO MEAS - MV DEC TIME: 0.12 SEC
BH CV ECHO MEAS - MV E MAX VEL: 96.3 CM/SEC
BH CV ECHO MEAS - MV E/A: 2.7
BH CV ECHO MEAS - MV MAX PG: 3.1 MMHG
BH CV ECHO MEAS - MV MEAN PG: 1.4 MMHG
BH CV ECHO MEAS - MV P1/2T MAX VEL: 91.2 CM/SEC
BH CV ECHO MEAS - MV P1/2T: 42.8 MSEC
BH CV ECHO MEAS - MV V2 MAX: 88 CM/SEC
BH CV ECHO MEAS - MV V2 MEAN: 55 CM/SEC
BH CV ECHO MEAS - MV V2 VTI: 17.9 CM
BH CV ECHO MEAS - MVA P1/2T LCG: 2.4 CM^2
BH CV ECHO MEAS - MVA(P1/2T): 5.1 CM^2
BH CV ECHO MEAS - MVA(VTI): 2.6 CM^2
BH CV ECHO MEAS - PA ACC TIME: 0.08 SEC
BH CV ECHO MEAS - PA MAX PG (FULL): 0.64 MMHG
BH CV ECHO MEAS - PA MAX PG: 1.3 MMHG
BH CV ECHO MEAS - PA PR(ACCEL): 42.2 MMHG
BH CV ECHO MEAS - PA V2 MAX: 57.3 CM/SEC
BH CV ECHO MEAS - PULM A REVS DUR: 0.08 SEC
BH CV ECHO MEAS - PULM A REVS VEL: 19.7 CM/SEC
BH CV ECHO MEAS - PULM DIAS VEL: 24.8 CM/SEC
BH CV ECHO MEAS - PULM S/D: 1.3
BH CV ECHO MEAS - PULM SYS VEL: 31.7 CM/SEC
BH CV ECHO MEAS - PVA(V,A): 2.4 CM^2
BH CV ECHO MEAS - PVA(V,D): 2.4 CM^2
BH CV ECHO MEAS - QP/QS: 0.65
BH CV ECHO MEAS - RAP SYSTOLE: 3 MMHG
BH CV ECHO MEAS - RV MAX PG: 0.68 MMHG
BH CV ECHO MEAS - RV MEAN PG: 0.42 MMHG
BH CV ECHO MEAS - RV V1 MAX: 41.1 CM/SEC
BH CV ECHO MEAS - RV V1 MEAN: 30.7 CM/SEC
BH CV ECHO MEAS - RV V1 VTI: 9.1 CM
BH CV ECHO MEAS - RVOT AREA: 3.3 CM^2
BH CV ECHO MEAS - RVOT DIAM: 2.1 CM
BH CV ECHO MEAS - RVSP: 18.6 MMHG
BH CV ECHO MEAS - SI(AO): 63.7 ML/M^2
BH CV ECHO MEAS - SI(CUBED): 37.1 ML/M^2
BH CV ECHO MEAS - SI(LVOT): 20.9 ML/M^2
BH CV ECHO MEAS - SI(MOD-SP2): 14 ML/M^2
BH CV ECHO MEAS - SI(MOD-SP4): 22.1 ML/M^2
BH CV ECHO MEAS - SI(TEICH): 29.7 ML/M^2
BH CV ECHO MEAS - SUP REN AO DIAM: 2.3 CM
BH CV ECHO MEAS - SV(AO): 141.3 ML
BH CV ECHO MEAS - SV(CUBED): 82.3 ML
BH CV ECHO MEAS - SV(LVOT): 46.4 ML
BH CV ECHO MEAS - SV(MOD-SP2): 31 ML
BH CV ECHO MEAS - SV(MOD-SP4): 49 ML
BH CV ECHO MEAS - SV(RVOT): 30.2 ML
BH CV ECHO MEAS - SV(TEICH): 65.9 ML
BH CV ECHO MEAS - TAPSE (>1.6): 2.1 CM
BH CV ECHO MEAS - TR MAX VEL: 197.6 CM/SEC
BH CV ECHO MEASUREMENTS AVERAGE E/E' RATIO: 10.14
BH CV VAS BP RIGHT ARM: NORMAL MMHG
BH CV XLRA - RV BASE: 3 CM
BH CV XLRA - RV LENGTH: 7.4 CM
BH CV XLRA - RV MID: 2.6 CM
BH CV XLRA - TDI S': 6.9 CM/SEC
LEFT ATRIUM VOLUME INDEX: 36 ML/M2
MAXIMAL PREDICTED HEART RATE: 148 BPM
SINUS: 3.1 CM
STJ: 3.3 CM
STRESS TARGET HR: 126 BPM

## 2021-02-03 ENCOUNTER — OFFICE VISIT (OUTPATIENT)
Dept: FAMILY MEDICINE CLINIC | Facility: CLINIC | Age: 71
End: 2021-02-03

## 2021-02-03 VITALS
SYSTOLIC BLOOD PRESSURE: 135 MMHG | TEMPERATURE: 97.5 F | DIASTOLIC BLOOD PRESSURE: 86 MMHG | WEIGHT: 233.1 LBS | HEART RATE: 85 BPM | OXYGEN SATURATION: 98 % | BODY MASS INDEX: 33.37 KG/M2 | HEIGHT: 70 IN

## 2021-02-03 DIAGNOSIS — Z00.00 MEDICARE ANNUAL WELLNESS VISIT, SUBSEQUENT: Primary | ICD-10-CM

## 2021-02-03 DIAGNOSIS — I48.92 ATRIAL FLUTTER, UNSPECIFIED TYPE (HCC): ICD-10-CM

## 2021-02-03 DIAGNOSIS — E03.9 ADULT HYPOTHYROIDISM: Chronic | ICD-10-CM

## 2021-02-03 DIAGNOSIS — Z12.5 SCREENING PSA (PROSTATE SPECIFIC ANTIGEN): ICD-10-CM

## 2021-02-03 DIAGNOSIS — R73.01 IMPAIRED FASTING GLUCOSE: Chronic | ICD-10-CM

## 2021-02-03 DIAGNOSIS — E78.00 HYPERCHOLESTEROLEMIA: Chronic | ICD-10-CM

## 2021-02-03 PROBLEM — R39.9 LOWER URINARY TRACT SYMPTOMS (LUTS): Chronic | Status: ACTIVE | Noted: 2019-10-10

## 2021-02-03 PROBLEM — E66.9 OBESITY (BMI 30.0-34.9): Chronic | Status: ACTIVE | Noted: 2020-12-24

## 2021-02-03 PROBLEM — R03.0 ELEVATED BLOOD PRESSURE READING: Chronic | Status: ACTIVE | Noted: 2019-10-10

## 2021-02-03 PROBLEM — E66.811 OBESITY (BMI 30.0-34.9): Chronic | Status: ACTIVE | Noted: 2020-12-24

## 2021-02-03 PROCEDURE — G0439 PPPS, SUBSEQ VISIT: HCPCS | Performed by: FAMILY MEDICINE

## 2021-02-03 PROCEDURE — 99214 OFFICE O/P EST MOD 30 MIN: CPT | Performed by: FAMILY MEDICINE

## 2021-02-03 PROCEDURE — 93000 ELECTROCARDIOGRAM COMPLETE: CPT | Performed by: FAMILY MEDICINE

## 2021-02-03 NOTE — PROGRESS NOTES
"Chief Complaint  Medicare Wellness-subsequent    Subjective          Juan Lima presents to [facility] for   History of Present Illness    Follow-up newly diagnosed atrial flutter about 1 month ago.  Found incidentally on preoperative cataract EKG.  He was referred to cardiology.  Echocardiogram was overall unremarkable.  He was started on aspirin because of his low [id number] score.  He states he feels fine.  He does not know he has atrial flutter.  He has had no irregular heartbeats.  No racing heartbeats.  He continues to exercise on a regular basis without exercise intolerance.  No shortness of breath.  No chest pain.    Hypothyroidism.  He continues on levothyroxine 150 mcg a day.  He had Graves' disease with previous thyroidectomy with recurrent thyroid growth and then later radioactive ablation.  His TSH recently was slightly high at 6, but his T4 was normal at 1.4.  He does not take biotin supplementation.    Hyperlipidemia.  Overall well controlled.  He continues on simvastatin 20 mg daily.  No myopathy symptoms.    Objective   Vital Signs:   /86   Pulse 85   Temp 97.5 °F (36.4 °C) (Temporal)   Ht 177.8 cm (70\")   Wt 106 kg (233 lb 1.6 oz)   SpO2 98%   BMI 33.45 kg/m²     Physical Exam  Vitals signs and nursing note reviewed.   Constitutional:       General: He is not in acute distress.     Appearance: He is well-developed.   Neck:      Thyroid: No thyromegaly.   Cardiovascular:      Rate and Rhythm: Normal rate and regular rhythm.      Heart sounds: Normal heart sounds.      Comments: Appears to be in sinus rhythm today.  Pulmonary:      Effort: Pulmonary effort is normal.      Breath sounds: Normal breath sounds.   Skin:     General: Skin is warm and dry.   Psychiatric:         Behavior: Behavior normal.         Thought Content: Thought content normal.         Judgment: Judgment normal.        Result Review :{ Labs  Result Review  Imaging  Med Tab  Media " :23}   The following data was reviewed by: Logan Rosario MD on 02/03/2021:  Common labs    Common Labsle 12/23/20 12/23/20 12/23/20    0849 0849 0849   Glucose  110 (A)    BUN  12    Creatinine  1.02    eGFR Non  Am  72    eGFR African Am  87    Sodium  137    Potassium  4.8    Chloride  100    Calcium  8.9    Total Protein  7.2    Albumin  4.30    Total Bilirubin  0.6    Alkaline Phosphatase  82    AST (SGOT)  21    ALT (SGPT)  19    WBC 6.53     Hemoglobin 16.2     Hematocrit 47.3     Platelets 178     Total Cholesterol   129   Triglycerides   61   HDL Cholesterol   68 (A)   LDL Cholesterol    48   (A) Abnormal value       Comments are available for some flowsheets but are not being displayed.                ECG 12 Lead    Date/Time: 2/3/2021 11:56 AM  Performed by: Logan Rosario MD  Authorized by: Logan Rosario MD   Comparison: compared with previous ECG from 12/23/2020  Similar to previous ECG  Rhythm: atrial flutter  Rate: normal  Conduction: conduction normal  Q waves: II, III and aVF    QRS axis: normal    Clinical impression: abnormal EKG              Assessment and Plan    Problem List Items Addressed This Visit        Unprioritized    Adult hypothyroidism (Chronic)    Overview     History of Graves' disease. Thyroidectomy with follow up ablation.          Relevant Orders    TSH Rfx On Abnormal To Free T4    Impaired fasting glucose (Chronic)    Relevant Orders    Hemoglobin A1c    Hypercholesterolemia (Chronic)    Atrial flutter (CMS/HCC) (Chronic)      Other Visit Diagnoses     Medicare annual wellness visit, subsequent    -  Primary    Screening PSA (prostate specific antigen)        Relevant Orders    PSA Screen        Atrial flutter.  Persistent.  Asymptomatic.  Continue aspirin.  Follow-up with cardiology as scheduled in about 6 weeks.    Impaired fasting glucose.  Recent glucose was 110 but he had eaten a protein bar that morning.  Checking A1c today.  Previous A1c was  normal.    Hyperlipidemia.  Continue simvastatin.    Hypothyroidism.  TSH was slightly elevated but T4 was normal.  Patient is not taking biotin supplementation.  Biotin can falsely raise the T4.  Rechecking TSH today.  If still elevated recommend increasing his levothyroxine.  Otherwise I will see him back in 6 months for recheck with lab work prior.      Follow Up   No follow-ups on file.  Patient was given instructions and counseling regarding his condition or for health maintenance advice. Please see specific information pulled into the AVS if appropriate.

## 2021-02-03 NOTE — PROGRESS NOTES
The ABCs of the Annual Wellness Visit  Subsequent Medicare Wellness Visit    Chief Complaint   Patient presents with   • Medicare Wellness-subsequent       Subjective   History of Present Illness:  Juan Lima is a 70 y.o. male who presents for a Subsequent Medicare Wellness Visit.    HEALTH RISK ASSESSMENT    Recent Hospitalizations:  No hospitalization(s) within the last year.    Current Medical Providers:  Patient Care Team:  Logan Rosario MD as PCP - General (Family Medicine)    Smoking Status:  Social History     Tobacco Use   Smoking Status Never Smoker   Smokeless Tobacco Never Used       Alcohol Consumption:  Social History     Substance and Sexual Activity   Alcohol Use None       Depression Screen:   PHQ-2/PHQ-9 Depression Screening 2/3/2021   Little interest or pleasure in doing things 0   Feeling down, depressed, or hopeless 0   Total Score 0       Fall Risk Screen:  STEADI Fall Risk Assessment was completed, and patient is at LOW risk for falls.Assessment completed on:2/3/2021    Health Habits and Functional and Cognitive Screening:  Functional & Cognitive Status 2/3/2021   Do you have difficulty preparing food and eating? No   Do you have difficulty bathing yourself, getting dressed or grooming yourself? No   Do you have difficulty using the toilet? No   Do you have difficulty moving around from place to place? No   Do you have trouble with steps or getting out of a bed or a chair? No   Current Diet Well Balanced Diet   Dental Exam Up to date   Eye Exam Up to date   Exercise (times per week) 4 times per week   Current Exercise Activities Include Walking   Do you need help using the phone?  No   Are you deaf or do you have serious difficulty hearing?  Yes   Do you need help with transportation? No   Do you need help shopping? No   Do you need help preparing meals?  No   Do you need help with housework?  No   Do you need help with laundry? No   Do you need help taking your medications? No   Do you  need help managing money? No   Do you ever drive or ride in a car without wearing a seat belt? No   Have you felt unusual stress, anger or loneliness in the last month? No   Who do you live with? Spouse   If you need help, do you have trouble finding someone available to you? No   Have you been bothered in the last four weeks by sexual problems? No   Do you have difficulty concentrating, remembering or making decisions? No         Does the patient have evidence of cognitive impairment? No    Asprin use counseling:Taking ASA appropriately as indicated    Age-appropriate Screening Schedule:  Refer to the list below for future screening recommendations based on patient's age, sex and/or medical conditions. Orders for these recommended tests are listed in the plan section. The patient has been provided with a written plan.    Health Maintenance   Topic Date Due   • TDAP/TD VACCINES (1 - Tdap) 10/29/1967   • ZOSTER VACCINE (1 of 2) 10/29/1998   • LIPID PANEL  12/23/2021   • COLONOSCOPY  08/17/2027   • INFLUENZA VACCINE  Completed          The following portions of the patient's history were reviewed and updated as appropriate: allergies, current medications, past family history, past medical history, past social history, past surgical history and problem list.    Outpatient Medications Prior to Visit   Medication Sig Dispense Refill   • aspirin (aspirin) 81 MG EC tablet Take 1 tablet by mouth Daily. 90 tablet 3   • Besivance 0.6 % suspension ophthalmic suspension SHAKE LQ AND INT 1 GTT IN SURGICAL EYE QID BEGINNING 1 DAY B SURGERY     • CBD (cannabidiol) oral oil Take  by mouth.     • diclofenac (VOLTAREN) 0.1 % ophthalmic solution INT 1 GTT IN SURGICAL EYE QID BEGINNING 1 DAY B SURGERY     • Durezol 0.05 % ophthalmic emulsion INT 1 GTT IN SURGICAL EYE QID BEGINNING 1 DAY B SURGERY     • Glucosamine-Chondroitin--200-150 MG tablet Take  by mouth.     • Inulin (FIBER CHOICE PO) Take 2 tablets by mouth Daily.     •  "levothyroxine (SYNTHROID, LEVOTHROID) 150 MCG tablet TAKE 1 TABLET BY MOUTH  DAILY 90 tablet 1   • Multiple Vitamins-Minerals (MULTIVITAMIN PO) Take 1 tablet by mouth Daily.     • Saw Palmetto 1000 MG capsule Take  by mouth.     • simvastatin (ZOCOR) 20 MG tablet TAKE 1 TABLET BY MOUTH  NIGHTLY 90 tablet 1     No facility-administered medications prior to visit.        Patient Active Problem List   Diagnosis   • Familial hypercholanemia   • Adult hypothyroidism   • Primary insomnia   • Impaired fasting glucose   • Lower urinary tract symptoms (LUTS)   • Elevated blood pressure reading   • Hypercholesterolemia   • Obesity (BMI 30.0-34.9)       Advanced Care Planning:  ACP discussion was held with the patient during this visit. Patient has an advance directive in EMR which is still valid.     Review of Systems    Compared to one year ago, the patient feels his physical health is the same.  Compared to one year ago, the patient feels his mental health is the same.    Reviewed chart for potential of high risk medication in the elderly: yes  Reviewed chart for potential of harmful drug interactions in the elderly:yes    Objective         Vitals:    02/03/21 1104   BP: 135/86   Pulse: 85   Temp: 97.5 °F (36.4 °C)   TempSrc: Temporal   SpO2: 98%   Weight: 106 kg (233 lb 1.6 oz)   Height: 177.8 cm (70\")       Body mass index is 33.45 kg/m².  Discussed the patient's BMI with him. The BMI is elevated. .    Physical Exam    Lab Results   Component Value Date     (H) 12/23/2020    CHLPL 129 12/23/2020    TRIG 61 12/23/2020    HDL 68 (H) 12/23/2020    LDL 48 12/23/2020    VLDL 13 12/23/2020        Assessment/Plan   Medicare Risks and Personalized Health Plan  CMS Preventative Services Quick Reference  Advance Directive Discussion  Immunizations Discussed/Encouraged (specific immunizations; COVID 19 vaccine discussed.  )    The above risks/problems have been discussed with the patient.  Pertinent information has been " shared with the patient in the After Visit Summary.  Follow up plans and orders are seen below in the Assessment/Plan Section.    Diagnoses and all orders for this visit:    1. Medicare annual wellness visit, subsequent (Primary)    2. Hypercholesterolemia    3. Adult hypothyroidism    4. Impaired fasting glucose      Follow Up:  No follow-ups on file.     An After Visit Summary and PPPS were given to the patient.

## 2021-02-04 LAB
HBA1C MFR BLD: 5.8 % (ref 4.8–5.6)
PSA SERPL-MCNC: 1.41 NG/ML (ref 0–4)
TSH SERPL DL<=0.005 MIU/L-ACNC: 4.29 UIU/ML (ref 0.27–4.2)

## 2021-02-04 NOTE — PROGRESS NOTES
The thyroid-stimulating hormone level is now nearly normal.  The T4 hormone levels normal, at this time I do not recommend changing thyroid dosing.  The A1c average blood sugar test is just minimally elevated.  However no evidence of diabetes or prediabetes.  PSA level normal.

## 2021-02-11 RX ORDER — LEVOTHYROXINE SODIUM 0.15 MG/1
150 TABLET ORAL DAILY
Qty: 90 TABLET | Refills: 1 | Status: SHIPPED | OUTPATIENT
Start: 2021-02-11 | End: 2021-07-14

## 2021-02-11 RX ORDER — SIMVASTATIN 20 MG
TABLET ORAL
Qty: 90 TABLET | Refills: 1 | Status: SHIPPED | OUTPATIENT
Start: 2021-02-11 | End: 2021-07-14

## 2021-03-09 DIAGNOSIS — Z23 IMMUNIZATION DUE: ICD-10-CM

## 2021-04-20 ENCOUNTER — OFFICE VISIT (OUTPATIENT)
Dept: CARDIOLOGY | Facility: CLINIC | Age: 71
End: 2021-04-20

## 2021-04-20 VITALS
DIASTOLIC BLOOD PRESSURE: 80 MMHG | BODY MASS INDEX: 31.92 KG/M2 | HEART RATE: 77 BPM | WEIGHT: 223 LBS | HEIGHT: 70 IN | OXYGEN SATURATION: 98 % | SYSTOLIC BLOOD PRESSURE: 114 MMHG

## 2021-04-20 DIAGNOSIS — I48.3 TYPICAL ATRIAL FLUTTER (HCC): Primary | Chronic | ICD-10-CM

## 2021-04-20 DIAGNOSIS — E66.9 OBESITY (BMI 30.0-34.9): Chronic | ICD-10-CM

## 2021-04-20 DIAGNOSIS — E78.00 HYPERCHOLESTEROLEMIA: Chronic | ICD-10-CM

## 2021-04-20 PROCEDURE — 93000 ELECTROCARDIOGRAM COMPLETE: CPT | Performed by: INTERNAL MEDICINE

## 2021-04-20 PROCEDURE — 99214 OFFICE O/P EST MOD 30 MIN: CPT | Performed by: INTERNAL MEDICINE

## 2021-04-20 NOTE — PROGRESS NOTES
PATIENTINFORMATION    Date of Office Visit: 2021  Encounter Provider: John Portillo MD  Place of Service: [facility] CARDIOLOGY  Patient Name: Juan Lima  : 1950    Subjective:     Encounter Date:2021      Patient ID: Juan Lima is a 70 y.o. male.    Chief Complaint   Patient presents with   • Atrial Flutter     follow up    • Hyperlipidemia     HPI  Mr. Lima is 70 years old man with past medical history of hyperlipidemia, atrial flutter and obesity came to cardiology clinic for follow-up visit.  He continues to deny any new symptoms since I saw him last time about 3 months ago.  He had unremarkable echocardiogram.  He continues to exercise most days of the week and walks at least 2 miles without any significant symptoms and he has modified his diet and has lost about 9 pounds since last visit.  No significant symptoms today.    ROS   All systems reviewed and negative except as noted in HPI.    Past Medical History:   Diagnosis Date   • Familial hypercholesteremia    • Graves disease        Past Surgical History:   Procedure Laterality Date   • COLONOSCOPY  10/217   • EYE SURGERY Left 2020   • EYE SURGERY Right 2021   • HERNIA REPAIR     • THYROIDECTOMY     • WRIST SURGERY  2017    left wrist        Social History     Socioeconomic History   • Marital status:      Spouse name: Not on file   • Number of children: Not on file   • Years of education: Not on file   • Highest education level: Not on file   Tobacco Use   • Smoking status: Never Smoker   • Smokeless tobacco: Never Used   Vaping Use   • Vaping Use: Never assessed   Substance and Sexual Activity   • Alcohol use: Yes     Alcohol/week: 2.0 standard drinks     Types: 2 Glasses of wine per week   • Drug use: No       Family History   Problem Relation Age of Onset   • Osteoporosis Mother    • Breast cancer Mother    • Scoliosis Mother    • Heart disease Father            ECG 12  "Lead    Date/Time: 4/20/2021 2:51 PM  Performed by: John Portillo MD  Authorized by: John Portillo MD   Comparison: compared with previous ECG from 12/23/2020  Rhythm: atrial flutter  Rate: normal  Conduction: conduction normal  ST Segments: ST segments normal  T Waves: T waves normal  QRS axis: normal  Other: no other findings    Clinical impression: abnormal EKG               Objective:     /80   Pulse 77   Ht 177.8 cm (70\")   Wt 101 kg (223 lb)   SpO2 98%   BMI 32.00 kg/m²  Body mass index is 32 kg/m².     Constitutional:       General: Not in acute distress.     Appearance: Well-developed. Not diaphoretic.   Eyes:      Pupils: Pupils are equal, round, and reactive to light.   HENT:      Head: Normocephalic and atraumatic.   Neck:      Thyroid: No thyromegaly.   Pulmonary:      Effort: Pulmonary effort is normal. No respiratory distress.      Breath sounds: Normal breath sounds. No wheezing. No rales.   Chest:      Chest wall: Not tender to palpatation.   Cardiovascular:      Normal rate. Regular rhythm.      No gallop.   Pulses:     Intact distal pulses.   Edema:     Peripheral edema absent.   Abdominal:      General: Bowel sounds are normal. There is no distension.      Palpations: Abdomen is soft.      Tenderness: There is no guarding.   Musculoskeletal: Normal range of motion.         General: No deformity.      Cervical back: Normal range of motion and neck supple. Skin:     General: Skin is warm and dry.      Findings: No rash.   Neurological:      Mental Status: Alert and oriented to person, place, and time.      Cranial Nerves: No cranial nerve deficit.      Deep Tendon Reflexes: Reflexes are normal and symmetric.   Psychiatric:         Judgment: Judgment normal.         Review Of Data:       Assessment/Plan:         Typical atrial flutter (CMS/HCC)    Hypercholesterolemia    Obesity (BMI 30.0-34.9)     Rate continues to be controlled.  Patient is reasonably active and exercise " regularly without any significant symptoms.  He has lost about 9 pounds since last visit and even determined to lose more.  Continue current care with aspirin and simvastatin.    Return to clinic in 1 year    Diagnosis and plan of care discussed with patient and verbalized understanding.           John Portillo MD  04/20/21  15:01 EDT

## 2021-07-14 RX ORDER — LEVOTHYROXINE SODIUM 0.15 MG/1
150 TABLET ORAL DAILY
Qty: 90 TABLET | Refills: 3 | Status: SHIPPED | OUTPATIENT
Start: 2021-07-14 | End: 2022-06-01

## 2021-07-14 RX ORDER — SIMVASTATIN 20 MG
TABLET ORAL
Qty: 90 TABLET | Refills: 3 | Status: SHIPPED | OUTPATIENT
Start: 2021-07-14 | End: 2022-06-01

## 2021-07-28 DIAGNOSIS — R73.01 IMPAIRED FASTING GLUCOSE: ICD-10-CM

## 2021-07-28 DIAGNOSIS — E03.9 ADULT HYPOTHYROIDISM: ICD-10-CM

## 2021-07-28 DIAGNOSIS — E78.00 HYPERCHOLESTEROLEMIA: Primary | ICD-10-CM

## 2021-07-29 LAB
ALBUMIN SERPL-MCNC: 4 G/DL (ref 3.5–5.2)
ALBUMIN/GLOB SERPL: 1.7 G/DL
ALP SERPL-CCNC: 65 U/L (ref 39–117)
ALT SERPL-CCNC: 20 U/L (ref 1–41)
AST SERPL-CCNC: 21 U/L (ref 1–40)
BILIRUB SERPL-MCNC: 0.6 MG/DL (ref 0–1.2)
BUN SERPL-MCNC: 15 MG/DL (ref 8–23)
BUN/CREAT SERPL: 17 (ref 7–25)
CALCIUM SERPL-MCNC: 8.7 MG/DL (ref 8.6–10.5)
CHLORIDE SERPL-SCNC: 106 MMOL/L (ref 98–107)
CHOLEST SERPL-MCNC: 118 MG/DL (ref 0–200)
CO2 SERPL-SCNC: 25 MMOL/L (ref 22–29)
CREAT SERPL-MCNC: 0.88 MG/DL (ref 0.76–1.27)
GLOBULIN SER CALC-MCNC: 2.4 GM/DL
GLUCOSE SERPL-MCNC: 115 MG/DL (ref 65–99)
HBA1C MFR BLD: 5.6 % (ref 4.8–5.6)
HDLC SERPL-MCNC: 56 MG/DL (ref 40–60)
LDLC SERPL CALC-MCNC: 51 MG/DL (ref 0–100)
POTASSIUM SERPL-SCNC: 4.9 MMOL/L (ref 3.5–5.2)
PROT SERPL-MCNC: 6.4 G/DL (ref 6–8.5)
SODIUM SERPL-SCNC: 139 MMOL/L (ref 136–145)
TRIGL SERPL-MCNC: 44 MG/DL (ref 0–150)
TSH SERPL DL<=0.005 MIU/L-ACNC: 2.12 UIU/ML (ref 0.27–4.2)
VLDLC SERPL CALC-MCNC: 11 MG/DL (ref 5–40)

## 2021-08-04 ENCOUNTER — OFFICE VISIT (OUTPATIENT)
Dept: FAMILY MEDICINE CLINIC | Facility: CLINIC | Age: 71
End: 2021-08-04

## 2021-08-04 VITALS
TEMPERATURE: 97.5 F | SYSTOLIC BLOOD PRESSURE: 131 MMHG | OXYGEN SATURATION: 100 % | DIASTOLIC BLOOD PRESSURE: 82 MMHG | WEIGHT: 211.1 LBS | HEART RATE: 79 BPM | HEIGHT: 70 IN | BODY MASS INDEX: 30.22 KG/M2

## 2021-08-04 DIAGNOSIS — R39.9 LOWER URINARY TRACT SYMPTOMS (LUTS): Primary | ICD-10-CM

## 2021-08-04 DIAGNOSIS — E78.00 HYPERCHOLESTEROLEMIA: Chronic | ICD-10-CM

## 2021-08-04 DIAGNOSIS — E03.9 ADULT HYPOTHYROIDISM: Chronic | ICD-10-CM

## 2021-08-04 DIAGNOSIS — I48.3 TYPICAL ATRIAL FLUTTER (HCC): Chronic | ICD-10-CM

## 2021-08-04 DIAGNOSIS — R73.01 IMPAIRED FASTING GLUCOSE: Chronic | ICD-10-CM

## 2021-08-04 PROCEDURE — 99214 OFFICE O/P EST MOD 30 MIN: CPT | Performed by: FAMILY MEDICINE

## 2021-08-04 RX ORDER — TAMSULOSIN HYDROCHLORIDE 0.4 MG/1
1 CAPSULE ORAL DAILY
Qty: 90 CAPSULE | Refills: 1 | Status: SHIPPED | OUTPATIENT
Start: 2021-08-04 | End: 2021-12-27

## 2021-08-04 NOTE — PROGRESS NOTES
"Chief Complaint  Hypothyroidism (follow up )    Subjective          Juan Lima presents to [facility]  History of Present Illness    6 months ago of urinary issues.  He will be sitting for period of time to stand up and immediately have to go and then have to keep going to urinate.  He does not have much nocturia.  He is not on a diuretic.  No dysuria.  His PSA was normal about 6 months ago.  The symptoms are becoming problematic.  More than a nuisance.    Atrial flutter.  Followed by cardiology.  They want him to continue on the aspirin.  He states he is having no palpitations or other cardiac symptoms.    Previous elevated blood pressure readings have improved.  He is exercising daily with a good mix of cardio and toning.    Hyperlipidemia.  He continues on simvastatin.  Lipid panel is excellent.  LFTs normal.    Impaired fasting glucose.  Glucose is up slightly but not diabetic.  Is A1c is now normal.        Objective   Vital Signs:   /82   Pulse 79   Temp 97.5 °F (36.4 °C) (Temporal)   Ht 177.8 cm (70\")   Wt 95.8 kg (211 lb 1.6 oz)   SpO2 100%   BMI 30.29 kg/m²     Physical Exam  Vitals and nursing note reviewed.   Constitutional:       General: He is not in acute distress.     Appearance: He is well-developed.   Cardiovascular:      Rate and Rhythm: Normal rate and regular rhythm.      Heart sounds: Normal heart sounds.   Pulmonary:      Effort: Pulmonary effort is normal.      Breath sounds: Normal breath sounds.   Abdominal:      Comments: Abdomen is nontender.    Quick look point-of-care ultrasound reveals a 5 cm prostate that is symmetric.  The bladder is not distended.   Genitourinary:     Comments: Prostate is +3/plus for size.  There is a questionable nodule at the left superior pole near the seminal vesicle, which may actually be the seminal vesicle.  Skin:     General: Skin is warm and dry.   Psychiatric:         Behavior: Behavior normal.         Thought " Content: Thought content normal.         Judgment: Judgment normal.        Result Review :                 Assessment and Plan    Diagnoses and all orders for this visit:    1. Lower urinary tract symptoms (LUTS) (Primary)  -     Ambulatory Referral to Urology    2. Hypercholesterolemia    3. Adult hypothyroidism    4. Impaired fasting glucose    5. Typical atrial flutter (CMS/HCC)    Other orders  -     tamsulosin (FLOMAX) 0.4 MG capsule 24 hr capsule; Take 1 capsule by mouth Daily.  Dispense: 90 capsule; Refill: 1      Lower urinary tract symptoms with enlarged prostate.  Questionable prostate nodule.  Normal PSA a few months ago.  Starting tamsulosin.  Side effects discussed.  Efficacy discussed.  Referring to urology for further evaluation and management if needed.    Hyperlipidemia.  Continue simvastatin.    Hypothyroidism.  TSH is therapeutic.    Impaired fasting glucose.  Much improved with diet and exercise.    Atrial flutter.  Appears to be in sinus rhythm today clinically.  He will continue aspirin.  Continue to follow with his cardiologist.  I will see him in 6 months for annual Medicare wellness visit.      Follow Up   No follow-ups on file.  Patient was given instructions and counseling regarding his condition or for health maintenance advice. Please see specific information pulled into the AVS if appropriate.

## 2021-12-27 RX ORDER — TAMSULOSIN HYDROCHLORIDE 0.4 MG/1
1 CAPSULE ORAL DAILY
Qty: 90 CAPSULE | Refills: 3 | Status: SHIPPED | OUTPATIENT
Start: 2021-12-27 | End: 2022-08-19 | Stop reason: SDUPTHER

## 2021-12-27 NOTE — TELEPHONE ENCOUNTER
PATIENT IS REQUESTING 1 YEAR SUPPLY      Rx Refill Note  Requested Prescriptions     Pending Prescriptions Disp Refills   • tamsulosin (FLOMAX) 0.4 MG capsule 24 hr capsule [Pharmacy Med Name: Tamsulosin HCl 0.4 MG Oral Capsule] 90 capsule 3     Sig: TAKE 1 CAPSULE BY MOUTH  DAILY      Last office visit with prescribing clinician: 8/4/2021      Next office visit with prescribing clinician: 2/9/2022          {TIP  Is Refill Pharmacy correct?:23}  Laly Ivory MA  12/27/21, 09:48 EST

## 2022-03-22 DIAGNOSIS — E03.9 ADULT HYPOTHYROIDISM: ICD-10-CM

## 2022-03-22 DIAGNOSIS — E78.00 HYPERCHOLESTEROLEMIA: Primary | ICD-10-CM

## 2022-03-22 DIAGNOSIS — E78.79 FAMILIAL HYPERCHOLANEMIA: ICD-10-CM

## 2022-03-22 DIAGNOSIS — R73.01 IMPAIRED FASTING GLUCOSE: ICD-10-CM

## 2022-03-29 ENCOUNTER — OFFICE VISIT (OUTPATIENT)
Dept: FAMILY MEDICINE CLINIC | Facility: CLINIC | Age: 72
End: 2022-03-29

## 2022-03-29 VITALS
TEMPERATURE: 96.9 F | SYSTOLIC BLOOD PRESSURE: 127 MMHG | WEIGHT: 215.6 LBS | BODY MASS INDEX: 30.86 KG/M2 | HEIGHT: 70 IN | OXYGEN SATURATION: 98 % | HEART RATE: 71 BPM | DIASTOLIC BLOOD PRESSURE: 75 MMHG

## 2022-03-29 DIAGNOSIS — I48.3 TYPICAL ATRIAL FLUTTER: Chronic | ICD-10-CM

## 2022-03-29 DIAGNOSIS — Z00.00 MEDICARE ANNUAL WELLNESS VISIT, SUBSEQUENT: Primary | ICD-10-CM

## 2022-03-29 DIAGNOSIS — E78.00 HYPERCHOLESTEROLEMIA: Chronic | ICD-10-CM

## 2022-03-29 DIAGNOSIS — E03.9 ADULT HYPOTHYROIDISM: Chronic | ICD-10-CM

## 2022-03-29 DIAGNOSIS — R73.01 IMPAIRED FASTING GLUCOSE: Chronic | ICD-10-CM

## 2022-03-29 PROCEDURE — G0439 PPPS, SUBSEQ VISIT: HCPCS | Performed by: FAMILY MEDICINE

## 2022-03-29 PROCEDURE — 1160F RVW MEDS BY RX/DR IN RCRD: CPT | Performed by: FAMILY MEDICINE

## 2022-03-29 PROCEDURE — 1170F FXNL STATUS ASSESSED: CPT | Performed by: FAMILY MEDICINE

## 2022-03-29 RX ORDER — OXYBUTYNIN CHLORIDE 5 MG/1
TABLET, EXTENDED RELEASE ORAL
COMMUNITY
Start: 2022-02-16

## 2022-03-29 NOTE — PROGRESS NOTES
The ABCs of the Annual Wellness Visit  Subsequent Medicare Wellness Visit    Chief Complaint   Patient presents with   • Hyperlipidemia     F/u   • Medicare Wellness-subsequent      Subjective    History of Present Illness:  Juan Lima is a 71 y.o. male who presents for a Subsequent Medicare Wellness Visit.    The following portions of the patient's history were reviewed and   updated as appropriate: allergies, current medications, past family history, past medical history, past social history, past surgical history and problem list.    Compared to one year ago, the patient feels his physical   health is the same.    Compared to one year ago, the patient feels his mental   health is the same.    Recent Hospitalizations:  He was not admitted to the hospital during the last year.       Current Medical Providers:  Patient Care Team:  Logan Rosario MD as PCP - General (Family Medicine)    Outpatient Medications Prior to Visit   Medication Sig Dispense Refill   • aspirin (aspirin) 81 MG EC tablet Take 1 tablet by mouth Daily. 90 tablet 3   • CBD (cannabidiol) oral oil Take  by mouth.     • Glucosamine-Chondroitin--200-150 MG tablet Take  by mouth.     • Inulin (FIBER CHOICE PO) Take 2 tablets by mouth Daily.     • levothyroxine (SYNTHROID, LEVOTHROID) 150 MCG tablet TAKE 1 TABLET BY MOUTH  DAILY 90 tablet 3   • Multiple Vitamins-Minerals (MULTIVITAMIN PO) Take 1 tablet by mouth Daily.     • oxybutynin XL (DITROPAN-XL) 5 MG 24 hr tablet      • Saw Palmetto 1000 MG capsule Take  by mouth.     • simvastatin (ZOCOR) 20 MG tablet TAKE 1 TABLET BY MOUTH AT  NIGHT 90 tablet 3   • tamsulosin (FLOMAX) 0.4 MG capsule 24 hr capsule TAKE 1 CAPSULE BY MOUTH  DAILY 90 capsule 3     No facility-administered medications prior to visit.       No opioid medication identified on active medication list. I have reviewed chart for other potential  high risk medication/s and harmful drug interactions in the  "elderly.          Aspirin is on active medication list. Aspirin use is indicated based on review of current medical condition/s. Pros and cons of this therapy have been discussed today. Benefits of this medication outweigh potential harm.  Patient has been encouraged to continue taking this medication.  .      Patient Active Problem List   Diagnosis   • Familial hypercholanemia   • Adult hypothyroidism   • Primary insomnia   • Impaired fasting glucose   • Lower urinary tract symptoms (LUTS)   • Elevated blood pressure reading   • Hypercholesterolemia   • Obesity (BMI 30.0-34.9)   • Atrial flutter (HCC)     Advance Care Planning  Advance Directive is on file.  ACP discussion was held with the patient during this visit. Patient has an advance directive in EMR which is still valid.           Objective    Vitals:    03/29/22 1250   BP: 127/75   Pulse: 71   Temp: 96.9 °F (36.1 °C)   TempSrc: Temporal   SpO2: 98%   Weight: 97.8 kg (215 lb 9.6 oz)   Height: 177.8 cm (70\")     BMI Readings from Last 1 Encounters:   03/29/22 30.94 kg/m²   BMI is above normal parameters. Recommendations include: exercise counseling    Does the patient have evidence of cognitive impairment? No    Physical Exam  Lab Results   Component Value Date    CHLPL 138 03/24/2022    TRIG 56 03/24/2022    HDL 59 03/24/2022    LDL 67 03/24/2022    VLDL 12 03/24/2022    HGBA1C 5.7 (H) 03/24/2022            HEALTH RISK ASSESSMENT    Smoking Status:  Social History     Tobacco Use   Smoking Status Never Smoker   Smokeless Tobacco Never Used     Alcohol Consumption:  Social History     Substance and Sexual Activity   Alcohol Use Yes   • Alcohol/week: 2.0 standard drinks   • Types: 2 Glasses of wine per week     Fall Risk Screen:    STEADI Fall Risk Assessment was completed, and patient is at LOW risk for falls.Assessment completed on:3/29/2022    Depression Screening:  PHQ-2/PHQ-9 Depression Screening 3/29/2022   Retired Total Score -   Little Interest or " Pleasure in Doing Things 0-->not at all   Feeling Down, Depressed or Hopeless 0-->not at all   PHQ-9: Brief Depression Severity Measure Score 0       Health Habits and Functional and Cognitive Screening:  Functional & Cognitive Status 3/29/2022   Do you have difficulty preparing food and eating? No   Do you have difficulty bathing yourself, getting dressed or grooming yourself? No   Do you have difficulty using the toilet? No   Do you have difficulty moving around from place to place? No   Do you have trouble with steps or getting out of a bed or a chair? No   Current Diet Well Balanced Diet   Dental Exam Up to date   Eye Exam Up to date   Exercise (times per week) 4 times per week   Current Exercises Include Walking   Current Exercise Activities Include -   Do you need help using the phone?  No   Are you deaf or do you have serious difficulty hearing?  Yes   Do you need help with transportation? No   Do you need help shopping? No   Do you need help preparing meals?  No   Do you need help with housework?  No   Do you need help with laundry? No   Do you need help taking your medications? No   Do you need help managing money? No   Do you ever drive or ride in a car without wearing a seat belt? No   Have you felt unusual stress, anger or loneliness in the last month? No   Who do you live with? Spouse   If you need help, do you have trouble finding someone available to you? No   Have you been bothered in the last four weeks by sexual problems? No   Do you have difficulty concentrating, remembering or making decisions? No       Age-appropriate Screening Schedule:  Refer to the list below for future screening recommendations based on patient's age, sex and/or medical conditions. Orders for these recommended tests are listed in the plan section. The patient has been provided with a written plan.    Health Maintenance   Topic Date Due   • TDAP/TD VACCINES (1 - Tdap) Never done   • ZOSTER VACCINE (1 of 2) Never done   •  LIPID PANEL  03/24/2023   • INFLUENZA VACCINE  Completed              Assessment/Plan   CMS Preventative Services Quick Reference  Risk Factors Identified During Encounter  Immunizations Discussed/Encouraged (specific Immunizations; Shingrix  The above risks/problems have been discussed with the patient.  Follow up actions/plans if indicated are seen below in the Assessment/Plan Section.  Pertinent information has been shared with the patient in the After Visit Summary.    Diagnoses and all orders for this visit:    1. Medicare annual wellness visit, subsequent (Primary)    2. Typical atrial flutter (HCC)    3. Hypercholesterolemia    4. Adult hypothyroidism    5. Impaired fasting glucose        Follow Up:   No follow-ups on file.     An After Visit Summary and PPPS were made available to the patient.

## 2022-03-29 NOTE — PROGRESS NOTES
"Chief Complaint  Hyperlipidemia (F/u) and Medicare Wellness-subsequent    Subjective          Juan Lima presents to [facility]  History of Present Illness     Hyperlipidemia follow-up.  He continues on simvastatin 20 mg daily.  His lipid panel is overall favorable.  He is tolerating medication.    Hypothyroidism.  TSH remains therapeutic.  Continues on his levothyroxine 150 mcg daily.    Lower urinary tract symptoms.  He continues on tamsulosin 0.4 mg daily prescribed by me, and also he was started on oxybutynin 5 mg a day by his urologist.  He states he had some dry mouth from the oxybutynin but otherwise it seems to help his urge urinary symptoms.    He continues aspirin daily for his history of atrial flutter.  No cardiovascular symptomatology.    Objective   Vital Signs:   /75   Pulse 71   Temp 96.9 °F (36.1 °C) (Temporal)   Ht 177.8 cm (70\")   Wt 97.8 kg (215 lb 9.6 oz)   SpO2 98%   BMI 30.94 kg/m²     BMI is above normal parameters. Recommendations: exercise counseling/recommendations       Physical Exam  Constitutional:       Appearance: Normal appearance.   HENT:      Head: Atraumatic.      Mouth/Throat:      Pharynx: Oropharynx is clear. No oropharyngeal exudate or posterior oropharyngeal erythema.   Eyes:      Conjunctiva/sclera: Conjunctivae normal.   Neck:      Thyroid: No thyroid mass, thyromegaly or thyroid tenderness.   Cardiovascular:      Rate and Rhythm: Normal rate and regular rhythm.      Pulses: Normal pulses.      Heart sounds: Normal heart sounds.   Pulmonary:      Effort: Pulmonary effort is normal.      Breath sounds: Normal breath sounds.   Abdominal:      General: Abdomen is flat. There is no distension.      Palpations: Abdomen is soft. There is no mass.      Tenderness: There is no abdominal tenderness.      Hernia: No hernia is present.   Musculoskeletal:         General: Normal range of motion.      Cervical back: Normal range of motion and " neck supple. No muscular tenderness.   Lymphadenopathy:      Cervical: No cervical adenopathy.   Skin:     General: Skin is warm and dry.      Findings: No rash.   Neurological:      General: No focal deficit present.      Mental Status: He is alert and oriented to person, place, and time.   Psychiatric:         Mood and Affect: Mood normal.        Result Review :   The following data was reviewed by: Logan Rosario MD on 03/29/2022:  Common labs    Common Labsle 7/28/21 7/28/21 7/28/21 3/24/22 3/24/22 3/24/22    0829 0829 0829 [id number]   Glucose 115 (A)   114 (A)     BUN 15   17     Creatinine 0.88   1.00     eGFR Non  Am 85        eGFR African Am 103        Sodium 139   136     Potassium 4.9   4.5     Chloride 106   101     Calcium 8.7   8.8     Total Protein 6.4   6.5     Albumin 4.00   4.0     Total Bilirubin 0.6   0.8     Alkaline Phosphatase 65   68     AST (SGOT) 21   24     ALT (SGPT) 20   18     Total Cholesterol   118   138   Triglycerides   44   56   HDL Cholesterol   56   59   LDL Cholesterol    51   67   Hemoglobin A1C  5.60   5.7 (A)    (A) Abnormal value       Comments are available for some flowsheets but are not being displayed.                     Assessment and Plan    Diagnoses and all orders for this visit:    1. Medicare annual wellness visit, subsequent (Primary)    2. Typical atrial flutter (HCC)    3. Hypercholesterolemia  -     Comprehensive Metabolic Panel; Future  -     Lipid Panel; Future    4. Adult hypothyroidism  -     TSH Rfx On Abnormal To Free T4; Future    5. Impaired fasting glucose  -     Hemoglobin A1c; Future      Atrial flutter.  He continues on low-dose aspirin.  No symptoms.    Hyperlipidemia.  Continue simvastatin.    Hypothyroidism.  Continue levothyroxine.  Check TSH prior to next visit in 6 months I see him back.    Impaired fasting glucose.  No evidence of progression of diabetes.  A1c up slightly.  Recently went on a cruise.    He will continue his  regular exercise.      Follow Up   No follow-ups on file.  Patient was given instructions and counseling regarding his condition or for health maintenance advice. Please see specific information pulled into the AVS if appropriate.       Answers for HPI/ROS submitted by the patient on 3/29/2022  What is the primary reason for your visit?: Physical

## 2022-04-21 NOTE — PROGRESS NOTES
Date of Office Visit: 2022  Encounter Provider: Radha Blake, MERCEDES, APRN  Place of Service: [facility] CARDIOLOGY  Patient Name: Juan Lima  :1950        Subjective:     Chief Complaint:  Atrial flutter with controlled ventricular response, hyperlipidemia      History of Present Illness:  Juan Lima is a 71 y.o. male patient of Dr. Portillo.  This patient is new to me and I have reviewed his records.    Patient has a history of typical atrial flutter, hyperlipidemia, obesity.    Patient was diagnosed with atrial flutter with controlled response 2020.  His [id number] score was a 1 due to his age greater than 65.  He was started on low-dose aspirin.  Echocardiogram 2021 showed normal LV systolic function with EF of 51%, mild basal asymmetric hypertrophy, mild to moderately dilated left atrial cavity, aortic valve sclerosis without regurgitation or stenosis, mild mitral regurgitation, trace tricuspid regurgitation, normal RVSP.  Patient was seen for follow-up 2021 at which point it was noted that he was exercising regularly and denied any significant symptoms and was continued on low-dose aspirin.  1 year follow-up was recommended.      Patient presents to office today for follow-up appointment.  Patient reports he continues to do well since last visit.  He is working out at least 4 to 5 days a week.  He does 30 minutes on the treadmill or elliptical if the weather is too poor to go outside and do walking.  He denies any exertional symptoms or concerns with this, no shortness of breath with this and feels stamina is good.  Might get some occasional mild shortness of breath with heavier exertion only but this is nothing new or worsening.  Denies any chest pain or discomfort, palpitations, edema, dizziness, syncope, near syncope, falls, fatigue, or abnormal bleeding.        Past Medical History:   Diagnosis Date   • Familial hypercholesteremia    • Graves  disease      Past Surgical History:   Procedure Laterality Date   • COLONOSCOPY  10/217   • EYE SURGERY Left 12/20/2020   • EYE SURGERY Right 01/01/2021   • HERNIA REPAIR     • THYROIDECTOMY     • WRIST SURGERY  05/2017    left wrist      Outpatient Medications Prior to Visit   Medication Sig Dispense Refill   • aspirin (aspirin) 81 MG EC tablet Take 1 tablet by mouth Daily. 90 tablet 3   • CBD (cannabidiol) oral oil Take  by mouth.     • Glucosamine-Chondroitin--200-150 MG tablet Take  by mouth.     • Inulin (FIBER CHOICE PO) Take 2 tablets by mouth Daily.     • levothyroxine (SYNTHROID, LEVOTHROID) 150 MCG tablet TAKE 1 TABLET BY MOUTH  DAILY 90 tablet 3   • Multiple Vitamins-Minerals (MULTIVITAMIN PO) Take 1 tablet by mouth Daily.     • oxybutynin XL (DITROPAN-XL) 5 MG 24 hr tablet      • Saw Palmetto 1000 MG capsule Take  by mouth.     • simvastatin (ZOCOR) 20 MG tablet TAKE 1 TABLET BY MOUTH AT  NIGHT 90 tablet 3   • tamsulosin (FLOMAX) 0.4 MG capsule 24 hr capsule TAKE 1 CAPSULE BY MOUTH  DAILY 90 capsule 3     No facility-administered medications prior to visit.       Allergies as of 04/22/2022   • (No Known Allergies)     Social History     Socioeconomic History   • Marital status:    Tobacco Use   • Smoking status: Never Smoker   • Smokeless tobacco: Never Used   Substance and Sexual Activity   • Alcohol use: Yes     Alcohol/week: 2.0 standard drinks     Types: 2 Glasses of wine per week   • Drug use: No     Family History   Problem Relation Age of Onset   • Osteoporosis Mother    • Breast cancer Mother    • Scoliosis Mother    • Heart disease Father        Review of Systems   Constitutional: Negative for malaise/fatigue.   Cardiovascular:        SEE HPI   Respiratory: Negative for shortness of breath.    Hematologic/Lymphatic: Negative for bleeding problem.   Musculoskeletal: Negative for falls.   Gastrointestinal: Negative for melena.   Genitourinary: Negative for hematuria.   Neurological:  "Negative for dizziness.   Psychiatric/Behavioral: Negative for altered mental status.          Objective:     Vitals:    04/22/22 1322   BP: 112/70   BP Location: Left arm   Patient Position: Sitting   Pulse: 70   Weight: 99.8 kg (220 lb)   Height: 177.8 cm (70\")     Body mass index is 31.57 kg/m².      PHYSICAL EXAM:  Constitutional:       General: Not in acute distress.     Appearance: Well-developed. Not diaphoretic.   HENT:      Head: Normocephalic and atraumatic.   Neck:      Vascular: No JVD.   Pulmonary:      Effort: Pulmonary effort is normal. No respiratory distress.      Breath sounds: Normal breath sounds. No wheezing. No rales.   Cardiovascular:      Normal rate. Regularly irregular rhythm.      Murmurs: There is no murmur.      No gallop. No click. No rub.   Edema:     Peripheral edema absent.   Abdominal:      General: Bowel sounds are normal. There is no distension.      Palpations: Abdomen is soft.   Musculoskeletal: Normal range of motion.         General: No tenderness or deformity.      Cervical back: Neck supple. Skin:     General: Skin is warm and dry.      Findings: No erythema or rash.   Neurological:      Mental Status: Alert and oriented to person, place, and time.   Psychiatric:         Behavior: Behavior normal.         Judgment: Judgment normal.             ECG 12 Lead    Date/Time: 4/22/2022 1:37 PM  Performed by: Radha Blake DNP, APRN  Authorized by: Radha Blake DNP, APRN   Comparison: compared with previous ECG from 4/20/2021  Rhythm: atrial flutter  BPM: 70  Comments: No significant changes from previous EKG              Assessment:       Diagnosis Plan   1. Typical atrial flutter (HCC)     2. Hypercholesterolemia           Plan:     1. Atrial flutter: With controlled ventricular rate.  Patient has been maintained on low-dose aspirin with [id number] score of 1 (age >65).  No history of CHF or LV dysfunction and denies history of diabetes, stroke, TIA, blood clot, or " vascular disease.  We discussed potential EP referral for further recommendations regarding long-term management of atrial flutter however patient is feeling well and feels he is asymptomatic from atrial flutter and he would like to continue with rate control strategy.  We also discussed benefits versus risks of full anticoagulation.  Patient verbalized understanding and would like to continue with low-dose aspirin at this point.  2. Hyperlipidemia: On statin therapy.  PCP managing.  Recent lipid panel 3/2022 through PCP showed total cholesterol of 138, triglycerides 56, HDL 59, LDL 67.  3. Hypothyroidism: PCP managing.  On levothyroxine.    Patient to follow-up with Dr. Portillo in 1 year or follow-up sooner if needed for any new, recurrent, or worsening symptoms or concerns.  Discussed in detail signs/symptoms that warrant sooner call or follow-up to the office or ER visit.             Your medication list          Accurate as of April 22, 2022  2:20 PM. If you have any questions, ask your nurse or doctor.            CONTINUE taking these medications      Instructions Last Dose Given Next Dose Due   aspirin 81 MG EC tablet      Take 1 tablet by mouth Daily.       CBD oral oil  Commonly known as: cannabidiol      Take  by mouth.       FIBER CHOICE PO      Take 2 tablets by mouth Daily.       Glucosamine-Chondroitin--200-150 MG tablet      Take  by mouth.       levothyroxine 150 MCG tablet  Commonly known as: SYNTHROID, LEVOTHROID      TAKE 1 TABLET BY MOUTH  DAILY       multivitamin tablet tablet  Commonly known as: THERAGRAN      Take 1 tablet by mouth Daily.       oxybutynin XL 5 MG 24 hr tablet  Commonly known as: DITROPAN-XL           Saw Palmetto 1000 MG capsule      Take  by mouth.       simvastatin 20 MG tablet  Commonly known as: ZOCOR      TAKE 1 TABLET BY MOUTH AT  NIGHT       tamsulosin 0.4 MG capsule 24 hr capsule  Commonly known as: FLOMAX      TAKE 1 CAPSULE BY MOUTH  DAILY              The  above medication changes may not have been made by this provider.  Patient's medication list was updated to reflect medications they are currently taking including medication changes made by other providers.            Thanks,    Radha Blake, DNP, APRN  04/22/2022         Dictated utilizing Dragon dictation

## 2022-04-22 ENCOUNTER — OFFICE VISIT (OUTPATIENT)
Dept: CARDIOLOGY | Facility: CLINIC | Age: 72
End: 2022-04-22

## 2022-04-22 VITALS
DIASTOLIC BLOOD PRESSURE: 70 MMHG | HEIGHT: 70 IN | WEIGHT: 220 LBS | SYSTOLIC BLOOD PRESSURE: 112 MMHG | BODY MASS INDEX: 31.5 KG/M2 | HEART RATE: 70 BPM

## 2022-04-22 DIAGNOSIS — I48.3 TYPICAL ATRIAL FLUTTER: Primary | Chronic | ICD-10-CM

## 2022-04-22 DIAGNOSIS — E78.00 HYPERCHOLESTEROLEMIA: Chronic | ICD-10-CM

## 2022-04-22 PROCEDURE — 99214 OFFICE O/P EST MOD 30 MIN: CPT | Performed by: NURSE PRACTITIONER

## 2022-04-22 PROCEDURE — 93000 ELECTROCARDIOGRAM COMPLETE: CPT | Performed by: NURSE PRACTITIONER

## 2022-06-01 RX ORDER — SIMVASTATIN 20 MG
TABLET ORAL
Qty: 90 TABLET | Refills: 0 | Status: SHIPPED | OUTPATIENT
Start: 2022-06-01 | End: 2022-08-15

## 2022-06-01 RX ORDER — LEVOTHYROXINE SODIUM 0.15 MG/1
150 TABLET ORAL DAILY
Qty: 90 TABLET | Refills: 0 | Status: SHIPPED | OUTPATIENT
Start: 2022-06-01 | End: 2022-08-15

## 2022-07-11 ENCOUNTER — OFFICE VISIT (OUTPATIENT)
Dept: FAMILY MEDICINE CLINIC | Facility: CLINIC | Age: 72
End: 2022-07-11

## 2022-07-11 VITALS
SYSTOLIC BLOOD PRESSURE: 120 MMHG | TEMPERATURE: 97.8 F | OXYGEN SATURATION: 99 % | HEART RATE: 103 BPM | DIASTOLIC BLOOD PRESSURE: 77 MMHG

## 2022-07-11 DIAGNOSIS — U07.1 UPPER RESPIRATORY TRACT INFECTION DUE TO COVID-19 VIRUS: Primary | ICD-10-CM

## 2022-07-11 DIAGNOSIS — J06.9 UPPER RESPIRATORY TRACT INFECTION DUE TO COVID-19 VIRUS: Primary | ICD-10-CM

## 2022-07-11 PROCEDURE — 99213 OFFICE O/P EST LOW 20 MIN: CPT | Performed by: FAMILY MEDICINE

## 2022-07-11 NOTE — PROGRESS NOTES
"Chief Complaint  URI (Covid + 7/9/22 and onset symptoms )    Subjective        Juan Lima presents to [facility]  History of Present Illness     Day 3 of mild to moderate COVID-19 URI.  Positive home test.  Head congestion.  Some cough.  No fever.  Feels rundown.  No history of chronic lung disease.  He is 71 years old.  No other significant risk factors.  He does take simvastatin for hyperlipidemia.  Last took his simvastatin last evening.... Greater than 12 hours ago.    Objective   Vital Signs:  /77   Pulse 103   Temp 97.8 °F (36.6 °C) (Temporal)   SpO2 99%   Estimated body mass index is 31.57 kg/m² as calculated from the following:    Height as of 4/22/22: 177.8 cm (70\").    Weight as of 4/22/22: 99.8 kg (220 lb).          Physical Exam  Constitutional:       Comments: He looks tired but nontoxic   HENT:      Head: Atraumatic.   Cardiovascular:      Rate and Rhythm: Normal rate.   Pulmonary:      Effort: Pulmonary effort is normal. No respiratory distress.      Breath sounds: Normal breath sounds. No stridor. No wheezing, rhonchi or rales.   Musculoskeletal:      Cervical back: Normal range of motion.        Physician and patient wore masks.  Provider N95 mask with eye protection    Result Review :                Assessment and Plan   Diagnoses and all orders for this visit:    1. Upper respiratory tract infection due to COVID-19 virus (Primary)    Other orders  -     Nirmatrelvir & Ritonavir (PAXLOVID) 20 x 150 MG & 10 x 100MG tablet therapy pack tablet; Take 3 tablets by mouth 2 (Two) Times a Day for 5 days. Stop Simvastatin 10 days  Dispense: 30 tablet; Refill: 0      Mild to moderate COVID-19 URI syndrome with risk factors including age.  Good candidate for Paxlovid.  He last took his simvastatin last night.  He is going to hold it for 10 more days.  Patient is aware that Paxlovid is a emergency use authorization medication.  However it may greatly reduce risk of " hospitalization and death.  Benefits outweigh risk.  GFR normal.  Sent to his pharmacy.  With severe symptoms he will seek medical attention.  Otherwise expectant management and rest.         Follow Up   No follow-ups on file.  Patient was given instructions and counseling regarding his condition or for health maintenance advice. Please see specific information pulled into the AVS if appropriate.       Answers for HPI/ROS submitted by the patient on 7/11/2022  What is the primary reason for your visit?: Other  Please describe your symptoms.: tested positive for covid  Have you had these symptoms before?: No  How long have you been having these symptoms?: 1-4 days  Please describe any probable cause for these symptoms. : exposed to a carrier last thursday

## 2022-08-15 RX ORDER — LEVOTHYROXINE SODIUM 0.15 MG/1
150 TABLET ORAL DAILY
Qty: 90 TABLET | Refills: 3 | Status: SHIPPED | OUTPATIENT
Start: 2022-08-15

## 2022-08-15 RX ORDER — SIMVASTATIN 20 MG
TABLET ORAL
Qty: 90 TABLET | Refills: 3 | Status: SHIPPED | OUTPATIENT
Start: 2022-08-15

## 2022-08-15 NOTE — TELEPHONE ENCOUNTER
Rx Refill Note  Requested Prescriptions     Pending Prescriptions Disp Refills   • levothyroxine (SYNTHROID, LEVOTHROID) 150 MCG tablet [Pharmacy Med Name: Levothyroxine Sodium 150 MCG Oral Tablet] 90 tablet 3     Sig: TAKE 1 TABLET BY MOUTH  DAILY   • simvastatin (ZOCOR) 20 MG tablet [Pharmacy Med Name: Simvastatin 20 MG Oral Tablet] 90 tablet 3     Sig: TAKE 1 TABLET BY MOUTH AT  NIGHT      Last office visit with prescribing clinician: 07/11/2022      Next office visit with prescribing clinician: 09/29/2022            Laly Ivory MA  08/15/22, 09:45 EDT

## 2022-08-19 RX ORDER — TAMSULOSIN HYDROCHLORIDE 0.4 MG/1
1 CAPSULE ORAL DAILY
Qty: 30 CAPSULE | Refills: 0 | Status: SHIPPED | OUTPATIENT
Start: 2022-08-19 | End: 2023-01-30

## 2022-08-19 NOTE — TELEPHONE ENCOUNTER
Rx Refill Note  Requested Prescriptions     Pending Prescriptions Disp Refills   • tamsulosin (FLOMAX) 0.4 MG capsule 24 hr capsule 90 capsule 1     Sig: Take 1 capsule by mouth Daily.      Last office visit with prescribing clinician: 7/11/2022      Next office visit with prescribing clinician: 9/29/2022            Louise Goetz  08/19/22, 10:30 EDT

## 2022-08-19 NOTE — TELEPHONE ENCOUNTER
Rx Refill Note  Requested Prescriptions     Pending Prescriptions Disp Refills   • tamsulosin (FLOMAX) 0.4 MG capsule 24 hr capsule 30 capsule 0     Sig: Take 1 capsule by mouth Daily.      Last office visit with prescribing clinician: 7/11/2022      Next office visit with prescribing clinician: 9/29/2022            Louise Goetz  08/19/22, 13:00 EDT

## 2022-08-19 NOTE — TELEPHONE ENCOUNTER
----- Message from Juan Lima sent at 8/19/2022 12:18 PM EDT -----  Regarding: TAMSULOSIN   This AM I asked for a refill of the above. It will take 7-10days to receive this after you authorize it. I now have 1 day of the script left and I'll be leaving town Tuesday for a week. Could you send a 30 supply to the [facility] on [address] to tide me over?   Thanks, Juan Lima

## 2022-09-29 ENCOUNTER — OFFICE VISIT (OUTPATIENT)
Dept: FAMILY MEDICINE CLINIC | Facility: CLINIC | Age: 72
End: 2022-09-29

## 2022-09-29 VITALS
OXYGEN SATURATION: 98 % | HEIGHT: 70 IN | HEART RATE: 73 BPM | SYSTOLIC BLOOD PRESSURE: 128 MMHG | DIASTOLIC BLOOD PRESSURE: 80 MMHG | WEIGHT: 212 LBS | TEMPERATURE: 96.6 F | BODY MASS INDEX: 30.35 KG/M2

## 2022-09-29 DIAGNOSIS — I48.3 TYPICAL ATRIAL FLUTTER: Chronic | ICD-10-CM

## 2022-09-29 DIAGNOSIS — R73.01 IMPAIRED FASTING GLUCOSE: Chronic | ICD-10-CM

## 2022-09-29 DIAGNOSIS — Z23 INFLUENZA VACCINE ADMINISTERED: ICD-10-CM

## 2022-09-29 DIAGNOSIS — E78.00 HYPERCHOLESTEROLEMIA: Primary | Chronic | ICD-10-CM

## 2022-09-29 DIAGNOSIS — E03.9 ADULT HYPOTHYROIDISM: Chronic | ICD-10-CM

## 2022-09-29 PROCEDURE — 90686 IIV4 VACC NO PRSV 0.5 ML IM: CPT | Performed by: FAMILY MEDICINE

## 2022-09-29 PROCEDURE — G0008 ADMIN INFLUENZA VIRUS VAC: HCPCS | Performed by: FAMILY MEDICINE

## 2022-09-29 PROCEDURE — 99214 OFFICE O/P EST MOD 30 MIN: CPT | Performed by: FAMILY MEDICINE

## 2022-09-29 NOTE — PROGRESS NOTES
"Chief Complaint  Hyperlipidemia    Subjective        Juan Lima presents to [facility]  History of Present Illness    Hyperlipidemia.  Continue simvastatin.  Lipid panel overall favorable with a higher HDL lower LDL.  Is been active more.  Eating better.  He only has bourbon now on weekends.  Previously having 1 or 2 bourbons a day.  Now just 1 or 2 on weekends.    Impaired fasting glucose.  Improved.  A1c improved.    Hypothyroidism.  TSH therapeutic.    Atrial flutter.  He continues low-dose aspirin as per cardiology.  No palpitations.    Lower urinary tract symptoms.  Followed by urology.  Continues tamsulosin and oxybutynin without adverse effect other than some dry mouth.      Objective   Vital Signs:  /80   Pulse 73   Temp 96.6 °F (35.9 °C) (Temporal)   Ht 177.8 cm (70\")   Wt 96.2 kg (212 lb)   SpO2 98%   BMI 30.42 kg/m²   Estimated body mass index is 30.42 kg/m² as calculated from the following:    Height as of this encounter: 177.8 cm (70\").    Weight as of this encounter: 96.2 kg (212 lb).          Physical Exam  Vitals and nursing note reviewed.   Constitutional:       General: He is not in acute distress.     Appearance: He is well-developed.   Cardiovascular:      Rate and Rhythm: Normal rate and regular rhythm.      Heart sounds: Normal heart sounds.   Pulmonary:      Effort: Pulmonary effort is normal.      Breath sounds: Normal breath sounds.   Musculoskeletal:      Cervical back: Normal range of motion.   Skin:     General: Skin is warm and dry.   Psychiatric:         Mood and Affect: Mood normal.        Result Review :  The following data was reviewed by: Logan Rosario MD on 09/29/2022:  Common labs    Common Labs 3/24/22 3/24/22 3/24/22 9/22/22 9/22/22 9/22/22    0906 0906 0906 0823 0823 0823   Glucose 114 (A)    110 (A)    BUN 17    14    Creatinine 1.00    0.98    Sodium 136    140    Potassium 4.5    4.4    Chloride 101    103    Calcium 8.8    8.8 "    Total Protein 6.5    6.2    Albumin 4.0    4.00    Total Bilirubin 0.8    0.8    Alkaline Phosphatase 68    68    AST (SGOT) 24    20    ALT (SGPT) 18    16    Total Cholesterol   138   120   Triglycerides   56   45   HDL Cholesterol   59   61 (A)   LDL Cholesterol    67   48   Hemoglobin A1C  5.7 (A)  5.60     (A) Abnormal value       Comments are available for some flowsheets but are not being displayed.                     Assessment and Plan   Diagnoses and all orders for this visit:    1. Hypercholesterolemia (Primary)  -     CBC & Differential; Future  -     Comprehensive Metabolic Panel; Future  -     Lipid Panel; Future  -     Hemoglobin A1c; Future  -     TSH Rfx On Abnormal To Free T4; Future    2. Adult hypothyroidism  -     TSH Rfx On Abnormal To Free T4; Future    3. Impaired fasting glucose  -     CBC & Differential; Future  -     Comprehensive Metabolic Panel; Future  -     Lipid Panel; Future  -     Hemoglobin A1c; Future  -     TSH Rfx On Abnormal To Free T4; Future    4. Typical atrial flutter (HCC)  -     CBC & Differential; Future  -     Comprehensive Metabolic Panel; Future  -     Lipid Panel; Future  -     Hemoglobin A1c; Future  -     TSH Rfx On Abnormal To Free T4; Future    5. Influenza vaccine administered  -     FluLaval/Fluzone >6 mos (3025-2994)      Hyperlipidemia.  Improved lipid panel.  Continue simvastatin.  Liver enzymes normal.    Hypothyroidism.  TSH therapeutic.  Continue same.  See me in 6 months for Medicare wellness visit.    Impaired fasting glucose.  Improved with diet and exercise.    A flutter.  Appears to be in sinus rhythm today.  Continues aspirin.  Also followed by cardiology.    Lower urinary tract symptoms.  Followed by urology.  Tolerating adverse effects of medication.       Follow Up   No follow-ups on file.  Patient was given instructions and counseling regarding his condition or for health maintenance advice. Please see specific information pulled into the  AVS if appropriate.

## 2022-11-03 ENCOUNTER — TELEPHONE (OUTPATIENT)
Dept: FAMILY MEDICINE CLINIC | Facility: CLINIC | Age: 72
End: 2022-11-03

## 2023-01-30 RX ORDER — TAMSULOSIN HYDROCHLORIDE 0.4 MG/1
1 CAPSULE ORAL DAILY
Qty: 90 CAPSULE | Refills: 1 | Status: SHIPPED | OUTPATIENT
Start: 2023-01-30

## 2023-01-30 NOTE — TELEPHONE ENCOUNTER
Rx Refill Note  Requested Prescriptions     Pending Prescriptions Disp Refills   • tamsulosin (FLOMAX) 0.4 MG capsule 24 hr capsule [Pharmacy Med Name: Tamsulosin HCl 0.4 MG Oral Capsule] 90 capsule 1     Sig: TAKE 1 CAPSULE BY MOUTH  DAILY      Last office visit with prescribing clinician: 9/29/2022   Last telemedicine visit with prescribing clinician: 4/5/2023   Next office visit with prescribing clinician: 4/12/2023                         Would you like a call back once the refill request has been completed: [] Yes [] No    If the office needs to give you a call back, can they leave a voicemail: [] Yes [] No    Louise Goetz  01/30/23, 16:35 EST

## 2023-04-05 DIAGNOSIS — R73.01 IMPAIRED FASTING GLUCOSE: Chronic | ICD-10-CM

## 2023-04-05 DIAGNOSIS — E78.00 HYPERCHOLESTEROLEMIA: Chronic | ICD-10-CM

## 2023-04-05 DIAGNOSIS — I48.3 TYPICAL ATRIAL FLUTTER: Chronic | ICD-10-CM

## 2023-04-06 LAB
ALBUMIN SERPL-MCNC: 4.2 G/DL (ref 3.5–5.2)
ALBUMIN/GLOB SERPL: 1.7 G/DL
ALP SERPL-CCNC: 63 U/L (ref 39–117)
ALT SERPL-CCNC: 13 U/L (ref 1–41)
AST SERPL-CCNC: 20 U/L (ref 1–40)
BASOPHILS # BLD AUTO: 0.02 10*3/MM3 (ref 0–0.2)
BASOPHILS NFR BLD AUTO: 0.4 % (ref 0–1.5)
BILIRUB SERPL-MCNC: 0.9 MG/DL (ref 0–1.2)
BUN SERPL-MCNC: 19 MG/DL (ref 8–23)
BUN/CREAT SERPL: 19.6 (ref 7–25)
CALCIUM SERPL-MCNC: 9.2 MG/DL (ref 8.6–10.5)
CHLORIDE SERPL-SCNC: 102 MMOL/L (ref 98–107)
CHOLEST SERPL-MCNC: 119 MG/DL (ref 0–200)
CO2 SERPL-SCNC: 26.4 MMOL/L (ref 22–29)
CREAT SERPL-MCNC: 0.97 MG/DL (ref 0.76–1.27)
EGFRCR SERPLBLD CKD-EPI 2021: 81.9 ML/MIN/1.73
EOSINOPHIL # BLD AUTO: 0.08 10*3/MM3 (ref 0–0.4)
EOSINOPHIL NFR BLD AUTO: 1.6 % (ref 0.3–6.2)
ERYTHROCYTE [DISTWIDTH] IN BLOOD BY AUTOMATED COUNT: 13.8 % (ref 12.3–15.4)
GLOBULIN SER CALC-MCNC: 2.5 GM/DL
GLUCOSE SERPL-MCNC: 109 MG/DL (ref 65–99)
HBA1C MFR BLD: 5.8 % (ref 4.8–5.6)
HCT VFR BLD AUTO: 43.1 % (ref 37.5–51)
HDLC SERPL-MCNC: 59 MG/DL (ref 40–60)
HGB BLD-MCNC: 14.1 G/DL (ref 13–17.7)
IMM GRANULOCYTES # BLD AUTO: 0.01 10*3/MM3 (ref 0–0.05)
IMM GRANULOCYTES NFR BLD AUTO: 0.2 % (ref 0–0.5)
LDLC SERPL CALC-MCNC: 48 MG/DL (ref 0–100)
LYMPHOCYTES # BLD AUTO: 1.38 10*3/MM3 (ref 0.7–3.1)
LYMPHOCYTES NFR BLD AUTO: 28 % (ref 19.6–45.3)
MCH RBC QN AUTO: 30.7 PG (ref 26.6–33)
MCHC RBC AUTO-ENTMCNC: 32.7 G/DL (ref 31.5–35.7)
MCV RBC AUTO: 93.9 FL (ref 79–97)
MONOCYTES # BLD AUTO: 0.64 10*3/MM3 (ref 0.1–0.9)
MONOCYTES NFR BLD AUTO: 13 % (ref 5–12)
NEUTROPHILS # BLD AUTO: 2.79 10*3/MM3 (ref 1.7–7)
NEUTROPHILS NFR BLD AUTO: 56.8 % (ref 42.7–76)
NRBC BLD AUTO-RTO: 0 /100 WBC (ref 0–0.2)
PLATELET # BLD AUTO: 151 10*3/MM3 (ref 140–450)
POTASSIUM SERPL-SCNC: 4.4 MMOL/L (ref 3.5–5.2)
PROT SERPL-MCNC: 6.7 G/DL (ref 6–8.5)
RBC # BLD AUTO: 4.59 10*6/MM3 (ref 4.14–5.8)
SODIUM SERPL-SCNC: 136 MMOL/L (ref 136–145)
TRIGL SERPL-MCNC: 55 MG/DL (ref 0–150)
VLDLC SERPL CALC-MCNC: 12 MG/DL (ref 5–40)
WBC # BLD AUTO: 4.92 10*3/MM3 (ref 3.4–10.8)

## 2023-04-12 ENCOUNTER — OFFICE VISIT (OUTPATIENT)
Dept: FAMILY MEDICINE CLINIC | Facility: CLINIC | Age: 73
End: 2023-04-12
Payer: MEDICARE

## 2023-04-12 VITALS
DIASTOLIC BLOOD PRESSURE: 76 MMHG | TEMPERATURE: 97.3 F | HEIGHT: 70 IN | WEIGHT: 217 LBS | HEART RATE: 69 BPM | OXYGEN SATURATION: 96 % | BODY MASS INDEX: 31.07 KG/M2 | SYSTOLIC BLOOD PRESSURE: 120 MMHG

## 2023-04-12 DIAGNOSIS — E78.00 HYPERCHOLESTEROLEMIA: ICD-10-CM

## 2023-04-12 DIAGNOSIS — R73.01 IMPAIRED FASTING GLUCOSE: ICD-10-CM

## 2023-04-12 DIAGNOSIS — Z00.00 MEDICARE ANNUAL WELLNESS VISIT, SUBSEQUENT: Primary | ICD-10-CM

## 2023-04-12 DIAGNOSIS — E03.9 ADULT HYPOTHYROIDISM: ICD-10-CM

## 2023-04-12 NOTE — PROGRESS NOTES
The ABCs of the Annual Wellness Visit  Subsequent Medicare Wellness Visit    Subjective    Juan Lima is a 72 y.o. male who presents for a Subsequent Medicare Wellness Visit.    The following portions of the patient's history were reviewed and   updated as appropriate: allergies, current medications, past family history, past medical history, past social history, past surgical history and problem list.    Compared to one year ago, the patient feels his physical   health is the same.    Compared to one year ago, the patient feels his mental   health is the same.    Recent Hospitalizations:  He was not admitted to the hospital during the last year.       Current Medical Providers:  Patient Care Team:  Logan Rosario MD as PCP - General (Family Medicine)    Outpatient Medications Prior to Visit   Medication Sig Dispense Refill   • aspirin (aspirin) 81 MG EC tablet Take 1 tablet by mouth Daily. 90 tablet 3   • CBD (cannabidiol) oral oil Take  by mouth.     • Glucosamine-Chondroitin--200-150 MG tablet Take  by mouth.     • Inulin (FIBER CHOICE PO) Take 2 tablets by mouth Daily.     • levothyroxine (SYNTHROID, LEVOTHROID) 150 MCG tablet TAKE 1 TABLET BY MOUTH  DAILY 90 tablet 3   • Multiple Vitamins-Minerals (MULTIVITAMIN PO) Take 1 tablet by mouth Daily.     • oxybutynin XL (DITROPAN-XL) 5 MG 24 hr tablet      • Saw Palmetto 1000 MG capsule Take  by mouth.     • simvastatin (ZOCOR) 20 MG tablet TAKE 1 TABLET BY MOUTH AT  NIGHT 90 tablet 3   • tamsulosin (FLOMAX) 0.4 MG capsule 24 hr capsule TAKE 1 CAPSULE BY MOUTH  DAILY 90 capsule 1     No facility-administered medications prior to visit.       No opioid medication identified on active medication list. I have reviewed chart for other potential  high risk medication/s and harmful drug interactions in the elderly.          Aspirin is on active medication list. Aspirin use is indicated based on review of current medical condition/s. Pros and cons of this therapy  "have been discussed today. Benefits of this medication outweigh potential harm.  Patient has been encouraged to continue taking this medication.  .      Patient Active Problem List   Diagnosis   • Familial hypercholanemia   • Adult hypothyroidism   • Primary insomnia   • Impaired fasting glucose   • Lower urinary tract symptoms (LUTS)   • Elevated blood pressure reading   • Hypercholesterolemia   • Obesity (BMI 30.0-34.9)   • Atrial flutter     Advance Care Planning   Advance Care Planning     Advance Directive is on file.  ACP discussion was held with the patient during this visit. Patient has an advance directive in EMR which is still valid.      Objective    Vitals:    04/12/23 0822   BP: 120/76   Pulse: 69   Temp: 97.3 °F (36.3 °C)   TempSrc: Temporal   SpO2: 96%   Weight: 98.4 kg (217 lb)   Height: 177.8 cm (70\")     Estimated body mass index is 31.14 kg/m² as calculated from the following:    Height as of this encounter: 177.8 cm (70\").    Weight as of this encounter: 98.4 kg (217 lb).    BMI is >= 30 and <35. (Class 1 Obesity). The following options were offered after discussion;: exercise counseling/recommendations      Does the patient have evidence of cognitive impairment? No    Lab Results   Component Value Date    CHLPL 119 04/05/2023    TRIG 55 04/05/2023    HDL 59 04/05/2023    LDL 48 04/05/2023    VLDL 12 04/05/2023    HGBA1C 5.80 (H) 04/05/2023        HEALTH RISK ASSESSMENT    Smoking Status:  Social History     Tobacco Use   Smoking Status Never   Smokeless Tobacco Never     Alcohol Consumption:  Social History     Substance and Sexual Activity   Alcohol Use Yes   • Alcohol/week: 2.0 standard drinks   • Types: 2 Shots of liquor per week     Fall Risk Screen:    ELVIE Fall Risk Assessment was completed, and patient is at LOW risk for falls.Assessment completed on:4/12/2023    Depression Screening:      3/29/2022    12:55 PM   PHQ-2/PHQ-9 Depression Screening   Little Interest or Pleasure in Doing " Things 0-->not at all   Feeling Down, Depressed or Hopeless 0-->not at all   PHQ-9: Brief Depression Severity Measure Score 0       Health Habits and Functional and Cognitive Screenin/12/2023     8:00 AM   Functional & Cognitive Status   Do you have difficulty preparing food and eating? No   Do you have difficulty bathing yourself, getting dressed or grooming yourself? No   Do you have difficulty using the toilet? No   Do you have difficulty moving around from place to place? No   Do you have trouble with steps or getting out of a bed or a chair? No   Current Diet Well Balanced Diet   Dental Exam Up to date   Eye Exam Up to date   Exercise (times per week) 5 times per week   Current Exercises Include Treadmill;Walking   Do you need help using the phone?  No   Are you deaf or do you have serious difficulty hearing?  Yes   Do you need help with transportation? No   Do you need help shopping? No   Do you need help preparing meals?  No   Do you need help with housework?  No   Do you need help with laundry? No   Do you need help taking your medications? No   Do you need help managing money? No   Do you ever drive or ride in a car without wearing a seat belt? No   Have you felt unusual stress, anger or loneliness in the last month? No   Who do you live with? Spouse   If you need help, do you have trouble finding someone available to you? No   Have you been bothered in the last four weeks by sexual problems? No   Do you have difficulty concentrating, remembering or making decisions? No       Age-appropriate Screening Schedule:  Refer to the list below for future screening recommendations based on patient's age, sex and/or medical conditions. Orders for these recommended tests are listed in the plan section. The patient has been provided with a written plan.    Health Maintenance   Topic Date Due   • TDAP/TD VACCINES (1 - Tdap) Never done   • ANNUAL WELLNESS VISIT  2023   • INFLUENZA VACCINE  2023   •  LIPID PANEL  04/05/2024   • COLORECTAL CANCER SCREENING  08/17/2027   • HEPATITIS C SCREENING  Completed   • COVID-19 Vaccine  Completed   • Pneumococcal Vaccine 65+  Completed   • ZOSTER VACCINE  Completed                  CMS Preventative Services Quick Reference  Risk Factors Identified During Encounter  mild obesity  The above risks/problems have been discussed with the patient.  Pertinent information has been shared with the patient in the After Visit Summary.  An After Visit Summary and PPPS were made available to the patient.    Follow Up:   Next Medicare Wellness visit to be scheduled in 1 year.       Additional E&M Note during same encounter follows:  Patient has multiple medical problems which are significant and separately identifiable that require additional work above and beyond the Medicare Wellness Visit.      Chief Complaint  Hyperlipidemia, Knee Injury (Tripped on Monday and hurt right knee ), and Medicare Wellness-subsequent    Subjective        HPI  Juan Lima is also being seen today for multiple medical concerns including a possible knee injury.    He was exercising at his gym.  He tripped over something.  He states he got distracted.  No lightheadedness.  No palpitations.  No passout.  No chest pain or shortness of breath.  No head injury.  He thinks he either twisted or banged his knee.  Bothering him a little bit.  Slightly swollen.    Hyperlipidemia.  Continue simvastatin 20 mg a day.  High HDL.    Impaired fasting glucose.  A1c up to 5.8%.  Fasting glucose around 110 steadily.  There is been no evidence of progression of diabetes.    Hypothyroidism.  Continues on levothyroxine 150 mcg a day.  I ordered a TSH but it was not done by the lab.    A flutter remote.  No cardiovascular symptoms.  No palpitations.  He continues on aspirin recommended by his cardiologist whom he will be seeing soon.    Lower urinary tract symptoms.  Continues on oxybutynin and tamsulosin.  The oxybutynin was  "prescribed by his urologist.  He does get dry mouth.  But has had no orthostatic changes.         Objective   Vital Signs:  /76   Pulse 69   Temp 97.3 °F (36.3 °C) (Temporal)   Ht 177.8 cm (70\")   Wt 98.4 kg (217 lb)   SpO2 96%   BMI 31.14 kg/m²     Physical Exam  Constitutional:       Appearance: Normal appearance.   HENT:      Head: Atraumatic.      Mouth/Throat:      Pharynx: Oropharynx is clear. No oropharyngeal exudate or posterior oropharyngeal erythema.   Eyes:      Conjunctiva/sclera: Conjunctivae normal.   Neck:      Thyroid: No thyroid mass, thyromegaly or thyroid tenderness.   Cardiovascular:      Rate and Rhythm: Normal rate and regular rhythm.      Pulses: Normal pulses.      Heart sounds: Normal heart sounds.   Pulmonary:      Effort: Pulmonary effort is normal.      Breath sounds: Normal breath sounds.   Abdominal:      General: Abdomen is flat. There is no distension.      Palpations: Abdomen is soft. There is no mass.      Tenderness: There is no abdominal tenderness.      Hernia: No hernia is present.   Musculoskeletal:         General: Normal range of motion.      Cervical back: Normal range of motion and neck supple. No muscular tenderness.      Comments: Good range of motion right knee.  Slight effusion.  Slight joint line tenderness.  Ligaments are tight and intact and nontender.  Gait nonantalgic.   Lymphadenopathy:      Cervical: No cervical adenopathy.   Skin:     General: Skin is warm and dry.      Findings: No rash.   Neurological:      General: No focal deficit present.      Mental Status: He is alert and oriented to person, place, and time.   Psychiatric:         Mood and Affect: Mood normal.          The following data was reviewed by: Logan Rosario MD on 04/12/2023:  Common labs        9/22/2022    08:23 4/5/2023    08:35   Common Labs   Glucose 110   109     BUN 14   19     Creatinine 0.98   0.97     Sodium 140   136     Potassium 4.4   4.4     Chloride 103   102   "   Calcium 8.8   9.2     Total Protein 6.2   6.7     Albumin 4.00   4.2     Total Bilirubin 0.8   0.9     Alkaline Phosphatase 68   63     AST (SGOT) 20   20     ALT (SGPT) 16   13     WBC  4.92     Hemoglobin  14.1     Hematocrit  43.1     Platelets  151     Total Cholesterol 120   119     Triglycerides 45   55     HDL Cholesterol 61   59     LDL Cholesterol  48   48     Hemoglobin A1C 5.60   5.80                  Assessment and Plan   Diagnoses and all orders for this visit:    1. Medicare annual wellness visit, subsequent (Primary)    2. Hypercholesterolemia  -     Hemoglobin A1c; Future  -     CBC & Differential; Future  -     Comprehensive Metabolic Panel; Future  -     Lipid Panel; Future  -     TSH Rfx On Abnormal To Free T4; Future    3. Impaired fasting glucose  -     Hemoglobin A1c; Future  -     CBC & Differential; Future  -     Comprehensive Metabolic Panel; Future  -     Lipid Panel; Future  -     TSH Rfx On Abnormal To Free T4; Future    4. Adult hypothyroidism  -     Hemoglobin A1c; Future  -     CBC & Differential; Future  -     Comprehensive Metabolic Panel; Future  -     Lipid Panel; Future  -     TSH Rfx On Abnormal To Free T4; Future      Hyperlipidemia.  Continue simvastatin.    Impaired fasting glucose.  No evidence of progression of diabetes.  Continue exercise.    Hypothyroidism.  Check TSH prior to next visit.  Continue levothyroxine.    Lower urinary tract symptoms.  He continues tamsulosin which I prescribed.  He continues oxybutynin.  He is aware that this combination could cause orthostatic changes and lightheadedness and may be increased fall risk.  However the recent knee injury at the gym he states was tripping over something and not from orthostatic symptoms.  With regards to his knee, I recommend he continue exercising.  Tylenol for pain.  If still giving him trouble in 3 weeks he will see us orthopedic doctor or get to [facility].    A flutter.  Unremarkable  cardiopulmonary exam today.  Regular rate and rhythm.  He continues low-dose aspirin recommended by his cardiologist.    See me in 6-month         Follow Up   No follow-ups on file.  Patient was given instructions and counseling regarding his condition or for health maintenance advice. Please see specific information pulled into the AVS if appropriate.

## 2023-04-28 ENCOUNTER — OFFICE VISIT (OUTPATIENT)
Dept: CARDIOLOGY | Facility: CLINIC | Age: 73
End: 2023-04-28
Payer: MEDICARE

## 2023-04-28 ENCOUNTER — TELEPHONE (OUTPATIENT)
Dept: CARDIOLOGY | Facility: CLINIC | Age: 73
End: 2023-04-28
Payer: MEDICARE

## 2023-04-28 ENCOUNTER — HOSPITAL ENCOUNTER (OUTPATIENT)
Dept: CARDIOLOGY | Facility: HOSPITAL | Age: 73
Discharge: HOME | End: 2023-04-28
Payer: MEDICARE

## 2023-04-28 VITALS
HEART RATE: 69 BPM | DIASTOLIC BLOOD PRESSURE: 70 MMHG | SYSTOLIC BLOOD PRESSURE: 118 MMHG | HEIGHT: 70 IN | BODY MASS INDEX: 31.35 KG/M2 | WEIGHT: 219 LBS

## 2023-04-28 DIAGNOSIS — I48.3 TYPICAL ATRIAL FLUTTER: Primary | Chronic | ICD-10-CM

## 2023-04-28 DIAGNOSIS — M79.89 RIGHT LEG SWELLING: ICD-10-CM

## 2023-04-28 LAB
BH CV LOWER VASCULAR LEFT COMMON FEMORAL AUGMENT: NORMAL
BH CV LOWER VASCULAR LEFT COMMON FEMORAL COMPETENT: NORMAL
BH CV LOWER VASCULAR LEFT COMMON FEMORAL COMPRESS: NORMAL
BH CV LOWER VASCULAR LEFT COMMON FEMORAL PHASIC: NORMAL
BH CV LOWER VASCULAR LEFT COMMON FEMORAL SPONT: NORMAL
BH CV LOWER VASCULAR RIGHT COMMON FEMORAL AUGMENT: NORMAL
BH CV LOWER VASCULAR RIGHT COMMON FEMORAL COMPETENT: NORMAL
BH CV LOWER VASCULAR RIGHT COMMON FEMORAL COMPRESS: NORMAL
BH CV LOWER VASCULAR RIGHT COMMON FEMORAL PHASIC: NORMAL
BH CV LOWER VASCULAR RIGHT COMMON FEMORAL SPONT: NORMAL
BH CV LOWER VASCULAR RIGHT DISTAL FEMORAL COMPRESS: NORMAL
BH CV LOWER VASCULAR RIGHT GASTRONEMIUS COMPRESS: NORMAL
BH CV LOWER VASCULAR RIGHT GREATER SAPH AK COMPRESS: NORMAL
BH CV LOWER VASCULAR RIGHT GREATER SAPH BK COMPRESS: NORMAL
BH CV LOWER VASCULAR RIGHT LESSER SAPH COMPRESS: NORMAL
BH CV LOWER VASCULAR RIGHT MID FEMORAL AUGMENT: NORMAL
BH CV LOWER VASCULAR RIGHT MID FEMORAL COMPETENT: NORMAL
BH CV LOWER VASCULAR RIGHT MID FEMORAL COMPRESS: NORMAL
BH CV LOWER VASCULAR RIGHT MID FEMORAL PHASIC: NORMAL
BH CV LOWER VASCULAR RIGHT MID FEMORAL SPONT: NORMAL
BH CV LOWER VASCULAR RIGHT PERONEAL COMPRESS: NORMAL
BH CV LOWER VASCULAR RIGHT POPLITEAL AUGMENT: NORMAL
BH CV LOWER VASCULAR RIGHT POPLITEAL COMPETENT: NORMAL
BH CV LOWER VASCULAR RIGHT POPLITEAL COMPRESS: NORMAL
BH CV LOWER VASCULAR RIGHT POPLITEAL PHASIC: NORMAL
BH CV LOWER VASCULAR RIGHT POPLITEAL SPONT: NORMAL
BH CV LOWER VASCULAR RIGHT POSTERIOR TIBIAL COMPRESS: NORMAL
BH CV LOWER VASCULAR RIGHT PROFUNDA FEMORAL COMPRESS: NORMAL
BH CV LOWER VASCULAR RIGHT PROXIMAL FEMORAL COMPRESS: NORMAL
BH CV LOWER VASCULAR RIGHT SAPHENOFEMORAL JUNCTION COMPRESS: NORMAL
BH CV VAS PRELIMINARY FINDINGS SCRIPTING: 1
MAXIMAL PREDICTED HEART RATE: 146 BPM
STRESS TARGET HR: 124 BPM

## 2023-04-28 PROCEDURE — 93971 EXTREMITY STUDY: CPT

## 2023-04-28 PROCEDURE — 99214 OFFICE O/P EST MOD 30 MIN: CPT | Performed by: INTERNAL MEDICINE

## 2023-04-28 PROCEDURE — 93000 ELECTROCARDIOGRAM COMPLETE: CPT | Performed by: INTERNAL MEDICINE

## 2023-04-28 NOTE — TELEPHONE ENCOUNTER
Vascular lab called with preliminary results. Notified Dr. Portillo that prelim of RLE doppler is negative for DVT.     Orders from Dr. Portillo: Advise to wear compression stocking prescribed, on right leg during day time and leg elevation at night. F/U with Y in one year.    Notified pt of orders from Dr. Portillo. Pt verbalized understanding and prefers to call back to schedule his 1 year f/u appt.    Thank you,    Dalia Kinney, RN  Triage [facility]  04/28/23 15:13 EDT

## 2023-10-19 ENCOUNTER — OFFICE VISIT (OUTPATIENT)
Dept: FAMILY MEDICINE CLINIC | Facility: CLINIC | Age: 73
End: 2023-10-19
Payer: MEDICARE

## 2023-10-19 VITALS
SYSTOLIC BLOOD PRESSURE: 118 MMHG | WEIGHT: 219.4 LBS | OXYGEN SATURATION: 98 % | BODY MASS INDEX: 31.41 KG/M2 | HEART RATE: 71 BPM | HEIGHT: 70 IN | DIASTOLIC BLOOD PRESSURE: 75 MMHG | TEMPERATURE: 97.7 F

## 2023-10-19 DIAGNOSIS — R73.01 IMPAIRED FASTING GLUCOSE: ICD-10-CM

## 2023-10-19 DIAGNOSIS — Z12.5 SCREENING PSA (PROSTATE SPECIFIC ANTIGEN): ICD-10-CM

## 2023-10-19 DIAGNOSIS — E78.00 HYPERCHOLESTEROLEMIA: Primary | ICD-10-CM

## 2023-10-19 DIAGNOSIS — E03.9 ADULT HYPOTHYROIDISM: ICD-10-CM

## 2023-10-19 PROCEDURE — 99213 OFFICE O/P EST LOW 20 MIN: CPT | Performed by: FAMILY MEDICINE

## 2023-10-19 NOTE — PROGRESS NOTES
"Chief Complaint  Hypothyroidism and Hyperlipidemia    Subjective        Juan Lima presents to [facility]  History of Present Illness    Follow-up hypothyroidism.  TSH therapeutic.  He continues on levothyroxine 150 mcg a day.  Also continue simvastatin.  And followed by cardiology and urology.  He has no concerns about his health today otherwise other than a right little finger nail that is been dystrophic looking for a number of months.    Objective   Vital Signs:  /75   Pulse 71   Temp 97.7 °F (36.5 °C) (Temporal)   Ht 176.5 cm (69.5\")   Wt 99.5 kg (219 lb 6.4 oz)   SpO2 98%   BMI 31.94 kg/m²   Estimated body mass index is 31.94 kg/m² as calculated from the following:    Height as of this encounter: 176.5 cm (69.5\").    Weight as of this encounter: 99.5 kg (219 lb 6.4 oz).               Physical Exam  Vitals and nursing note reviewed.   Constitutional:       General: He is not in acute distress.     Appearance: He is well-developed.   Neck:      Comments: Thyroid exam unremarkable  Cardiovascular:      Rate and Rhythm: Normal rate and regular rhythm.      Heart sounds: Normal heart sounds.   Pulmonary:      Effort: Pulmonary effort is normal.      Breath sounds: Normal breath sounds.   Musculoskeletal:      Cervical back: No tenderness.      Comments: Right little finger.  The nail is dystrophic.  No abnormal pigmentation.  No subungual skin lesions seen.  No erosion, ulceration, discharge, or abnormal capillaries.  Very likely onychomycosis.  Treatment not recommended.  We discussed this in some detail.   Lymphadenopathy:      Cervical: No cervical adenopathy.   Skin:     General: Skin is warm and dry.   Psychiatric:         Mood and Affect: Mood normal.        Result Review :  The following data was reviewed by: Logan Rosario MD on 10/19/2023:  Common labs          4/5/2023    08:35   Common Labs   Glucose 109    BUN 19    Creatinine 0.97    Sodium 136  "   Potassium 4.4    Chloride 102    Calcium 9.2    Total Protein 6.7    Albumin 4.2    Total Bilirubin 0.9    Alkaline Phosphatase 63    AST (SGOT) 20    ALT (SGPT) 13    WBC 4.92    Hemoglobin 14.1    Hematocrit 43.1    Platelets 151    Total Cholesterol 119    Triglycerides 55    HDL Cholesterol 59    LDL Cholesterol  48    Hemoglobin A1C 5.80                   Assessment and Plan   Diagnoses and all orders for this visit:    1. Hypercholesterolemia (Primary)  -     Hemoglobin A1c; Future  -     CBC & Differential; Future  -     Comprehensive Metabolic Panel; Future  -     Lipid Panel; Future  -     TSH Rfx On Abnormal To Free T4; Future    2. Adult hypothyroidism  -     Hemoglobin A1c; Future  -     CBC & Differential; Future  -     Comprehensive Metabolic Panel; Future  -     Lipid Panel; Future  -     TSH Rfx On Abnormal To Free T4; Future    3. Impaired fasting glucose  -     Hemoglobin A1c; Future  -     CBC & Differential; Future  -     Comprehensive Metabolic Panel; Future  -     Lipid Panel; Future  -     TSH Rfx On Abnormal To Free T4; Future    4. Screening PSA (prostate specific antigen)  -     PSA Screen; Future      Hypothyroidism.  TSH therapeutic.  See me in 6 months for annual Medicare wellness visit with lab work prior.         Follow Up   No follow-ups on file.  Patient was given instructions and counseling regarding his condition or for health maintenance advice. Please see specific information pulled into the AVS if appropriate.

## 2023-12-21 ENCOUNTER — OFFICE VISIT (OUTPATIENT)
Dept: ORTHOPEDIC SURGERY | Facility: CLINIC | Age: 73
End: 2023-12-21
Payer: MEDICARE

## 2023-12-21 VITALS — TEMPERATURE: 98 F | HEIGHT: 69 IN | BODY MASS INDEX: 32.35 KG/M2 | WEIGHT: 218.4 LBS

## 2023-12-21 DIAGNOSIS — M54.16 LUMBAR RADICULOPATHY: Primary | ICD-10-CM

## 2023-12-21 DIAGNOSIS — M25.551 RIGHT HIP PAIN: ICD-10-CM

## 2023-12-21 PROCEDURE — 99214 OFFICE O/P EST MOD 30 MIN: CPT | Performed by: ORTHOPAEDIC SURGERY

## 2023-12-21 NOTE — PROGRESS NOTES
General Exam    Patient: Juan Lima    YOB: 1950    Medical Record Number: [id number]    Chief Complaints: Right buttock and leg pain    History of Present Illness:     73 y.o. male patient who presents for evaluation of right buttock and leg pain.  Patient states he has noticed over the past few months pain in his right buttock that radiates down his leg to his ankle.  Denies any groin pain.  Denies any recent injuries.  No bowel or bladder issues.  He is taken some Aleve which does help    Denies any numbness or tingling.  Denies any fevers, cough or shortness of breath.    Allergies: No Known Allergies    Home Medications:      Current Outpatient Medications:     aspirin (aspirin) 81 MG EC tablet, Take 1 tablet by mouth Daily., Disp: 90 tablet, Rfl: 3    CBD (cannabidiol) oral oil, Take  by mouth., Disp: , Rfl:     Glucosamine-Chondroitin--200-150 MG tablet, Take  by mouth., Disp: , Rfl:     Inulin (FIBER CHOICE PO), Take 2 tablets by mouth Daily., Disp: , Rfl:     levothyroxine (SYNTHROID, LEVOTHROID) 150 MCG tablet, TAKE 1 TABLET BY MOUTH  DAILY, Disp: 90 tablet, Rfl: 3    Multiple Vitamins-Minerals (MULTIVITAMIN PO), Take 1 tablet by mouth Daily., Disp: , Rfl:     oxybutynin XL (DITROPAN-XL) 5 MG 24 hr tablet, , Disp: , Rfl:     Saw Palmetto 1000 MG capsule, Take  by mouth., Disp: , Rfl:     simvastatin (ZOCOR) 20 MG tablet, TAKE 1 TABLET BY MOUTH AT  NIGHT, Disp: 90 tablet, Rfl: 3    tamsulosin (FLOMAX) 0.4 MG capsule 24 hr capsule, TAKE 1 CAPSULE BY MOUTH DAILY, Disp: 90 capsule, Rfl: 3    Past Medical History:   Diagnosis Date    Familial hypercholesteremia     Graves disease        Past Surgical History:   Procedure Laterality Date    COLONOSCOPY  10/217    EYE SURGERY Left 12/20/2020    EYE SURGERY Right 01/01/2021    HERNIA REPAIR      THYROIDECTOMY      WRIST SURGERY  05/2017    left wrist        Social History     Occupational History    Not on file   Tobacco Use    Smoking  "status: Never     Passive exposure: Never    Smokeless tobacco: Never   Vaping Use    Vaping Use: Never used   Substance and Sexual Activity    Alcohol use: Yes     Alcohol/week: 2.0 standard drinks of alcohol     Types: 2 Shots of liquor per week    Drug use: No    Sexual activity: Yes     Partners: Female     Birth control/protection: None      Social History     Social History Narrative    Not on file       Family History   Problem Relation Age of Onset    Osteoporosis Mother     Breast cancer Mother     Scoliosis Mother     Heart disease Father        Review of Systems:      Constitutional: Denies fever, shaking or chills         All other pertinent positives and negatives as noted above in HPI.    Physical Exam: 73 y.o. male    Vitals:    12/21/23 1016   Temp: 98 °F (36.7 °C)   TempSrc: Temporal   Weight: 99.1 kg (218 lb 6.4 oz)   Height: 176.5 cm (69.49\")       General:  Patient is awake and alert.  Appears in no acute distress or discomfort.      Musculoskeletal/Extremities:    Right lower extremity examined no tenderness about the hip or leg.  Gentle range of motion well-tolerated and is full and equal to contralateral side.  Negative straight leg raise and Stinchfield.  Motor and sensory intact distally.         Radiology:       3 views right hip AP pelvis, AP and lateral taken reviewed to evaluate the patient's complaint/s.    Imaging shows overall preservation of hip joint space.  There is evidence of degenerative changes of the lumbar spine.     No imaging for comparison.    Assessment:  Right sided Lumbar radiculopathy       Plan:      Discussed the finds with the patient this is coming more from his back.  This time recommend continue his anti-inflammatory on a regular basis with food as long as he can tolerate and will start some formal physical therapy.  I like him to follow-up with our spine nurse practitioner here about 4 weeks.  The other new issues arise he will let us know.           We will " plan for follow up as above.    All questions were answered.  Patient understands and agrees with the plan.    Brock Bright MD    12/21/2023    CC to Logan Rosario MD        Answers submitted by the patient for this visit:  Primary Reason for Visit (Submitted on 12/14/2023)  What is the primary reason for your visit?: Other  Other (Submitted on 12/14/2023)  Please describe your symptoms.: Pain in my right hip. Often the pain will migrate to my lower back.  Have you had these symptoms before?: Yes  How long have you been having these symptoms?: Greater than 2 weeks  Please describe any probable cause for these symptoms. : Long term athletic program

## 2024-04-18 ENCOUNTER — OFFICE VISIT (OUTPATIENT)
Dept: FAMILY MEDICINE CLINIC | Facility: CLINIC | Age: 74
End: 2024-04-18
Payer: MEDICARE

## 2024-04-18 VITALS
OXYGEN SATURATION: 98 % | DIASTOLIC BLOOD PRESSURE: 72 MMHG | BODY MASS INDEX: 32.72 KG/M2 | WEIGHT: 220.9 LBS | HEART RATE: 68 BPM | TEMPERATURE: 98.2 F | HEIGHT: 69 IN | SYSTOLIC BLOOD PRESSURE: 120 MMHG

## 2024-04-18 DIAGNOSIS — R73.01 IMPAIRED FASTING GLUCOSE: ICD-10-CM

## 2024-04-18 DIAGNOSIS — E03.9 ADULT HYPOTHYROIDISM: ICD-10-CM

## 2024-04-18 DIAGNOSIS — E78.00 HYPERCHOLESTEROLEMIA: ICD-10-CM

## 2024-04-18 DIAGNOSIS — Z00.00 MEDICARE ANNUAL WELLNESS VISIT, SUBSEQUENT: Primary | ICD-10-CM

## 2024-04-18 DIAGNOSIS — Z12.11 ENCOUNTER FOR SCREENING COLONOSCOPY: ICD-10-CM

## 2024-04-18 RX ORDER — KETOCONAZOLE 20 MG/ML
SHAMPOO TOPICAL 2 TIMES WEEKLY
Qty: 120 ML | Refills: 0 | Status: SHIPPED | OUTPATIENT
Start: 2024-04-18

## 2024-04-18 NOTE — PROGRESS NOTES
The ABCs of the Annual Wellness Visit  Subsequent Medicare Wellness Visit    Subjective    Juan Lima is a 73 y.o. male who presents for a Subsequent Medicare Wellness Visit.    The following portions of the patient's history were reviewed and   updated as appropriate: allergies, current medications, past family history, past medical history, past social history, past surgical history, and problem list.    Compared to one year ago, the patient feels his physical   health is the same.    Compared to one year ago, the patient feels his mental   health is the same.    Recent Hospitalizations:  He was not admitted to the hospital during the last year.       Current Medical Providers:  Patient Care Team:  Logan Rosario MD as PCP - General (Family Medicine)    Outpatient Medications Prior to Visit   Medication Sig Dispense Refill    aspirin (aspirin) 81 MG EC tablet Take 1 tablet by mouth Daily. 90 tablet 3    CBD (cannabidiol) oral oil Take  by mouth.      Glucosamine-Chondroitin--200-150 MG tablet Take  by mouth.      Inulin (FIBER CHOICE PO) Take 2 tablets by mouth Daily.      levothyroxine (SYNTHROID, LEVOTHROID) 150 MCG tablet TAKE 1 TABLET BY MOUTH  DAILY 90 tablet 3    Multiple Vitamins-Minerals (MULTIVITAMIN PO) Take 1 tablet by mouth Daily.      oxybutynin XL (DITROPAN-XL) 5 MG 24 hr tablet       Saw Palmetto 1000 MG capsule Take  by mouth.      simvastatin (ZOCOR) 20 MG tablet TAKE 1 TABLET BY MOUTH AT  NIGHT 90 tablet 3    tamsulosin (FLOMAX) 0.4 MG capsule 24 hr capsule TAKE 1 CAPSULE BY MOUTH DAILY 90 capsule 3     No facility-administered medications prior to visit.       No opioid medication identified on active medication list. I have reviewed chart for other potential  high risk medication/s and harmful drug interactions in the elderly.        Aspirin is on active medication list. Aspirin use is indicated based on review of current medical condition/s. Pros and cons of this therapy have been  "discussed today. Benefits of this medication outweigh potential harm.  Patient has been encouraged to continue taking this medication.  .      Patient Active Problem List   Diagnosis    Familial hypercholanemia    Adult hypothyroidism    Primary insomnia    Impaired fasting glucose    Lower urinary tract symptoms (LUTS)    Elevated blood pressure reading    Hypercholesterolemia    Obesity (BMI 30.0-34.9)    Atrial flutter     Advance Care Planning   Advance Care Planning     Advance Directive is on file.  ACP discussion was held with the patient during this visit. Patient has an advance directive in EMR which is still valid.      Objective    Vitals:    24 1018   BP: 120/72   Pulse: 68   Temp: 98.2 °F (36.8 °C)   TempSrc: Temporal   SpO2: 98%   Weight: 100 kg (220 lb 14.4 oz)   Height: 176.5 cm (69.49\")     Estimated body mass index is 32.16 kg/m² as calculated from the following:    Height as of this encounter: 176.5 cm (69.49\").    Weight as of this encounter: 100 kg (220 lb 14.4 oz).           Does the patient have evidence of cognitive impairment? No    Lab Results   Component Value Date    CHLPL 137 2024    TRIG 56 2024    HDL 64 (H) 2024    LDL 61 2024    VLDL 12 2024    HGBA1C 6.00 (H) 2024        HEALTH RISK ASSESSMENT    Smoking Status:  Social History     Tobacco Use   Smoking Status Never    Passive exposure: Never   Smokeless Tobacco Never     Alcohol Consumption:  Social History     Substance and Sexual Activity   Alcohol Use Yes    Alcohol/week: 2.0 standard drinks of alcohol    Types: 2 Shots of liquor per week     Fall Risk Screen:    STEADI Fall Risk Assessment was completed, and patient is at LOW risk for falls.Assessment completed on:2024    Depression Screenin/18/2024    10:16 AM   PHQ-2/PHQ-9 Depression Screening   Little Interest or Pleasure in Doing Things 0-->not at all   Feeling Down, Depressed or Hopeless 0-->not at all   PHQ-9: Brief " Depression Severity Measure Score 0       Health Habits and Functional and Cognitive Screenin/18/2024    10:15 AM   Functional & Cognitive Status   Do you have difficulty preparing food and eating? No   Do you have difficulty bathing yourself, getting dressed or grooming yourself? No   Do you have difficulty using the toilet? No   Do you have difficulty moving around from place to place? No   Do you have trouble with steps or getting out of a bed or a chair? No   Current Diet Well Balanced Diet   Dental Exam Up to date   Eye Exam Up to date   Exercise (times per week) 4 times per week   Current Exercises Include Walking   Do you need help using the phone?  No   Are you deaf or do you have serious difficulty hearing?  Yes   Do you need help to go to places out of walking distance? No   Do you need help shopping? No   Do you need help preparing meals?  No   Do you need help with housework?  No   Do you need help with laundry? No   Do you need help taking your medications? No   Do you need help managing money? No   Do you ever drive or ride in a car without wearing a seat belt? No   Have you felt unusual stress, anger or loneliness in the last month? No   Who do you live with? Spouse   If you need help, do you have trouble finding someone available to you? No   Have you been bothered in the last four weeks by sexual problems? No   Do you have difficulty concentrating, remembering or making decisions? No       Age-appropriate Screening Schedule:  Refer to the list below for future screening recommendations based on patient's age, sex and/or medical conditions. Orders for these recommended tests are listed in the plan section. The patient has been provided with a written plan.    Health Maintenance   Topic Date Due    TDAP/TD VACCINES (1 - Tdap) Never done    RSV Vaccine - Adults (1 - 1-dose 60+ series) Never done    COLORECTAL CANCER SCREENING  2022    INFLUENZA VACCINE  2024    BMI FOLLOWUP   "12/21/2024    LIPID PANEL  04/11/2025    ANNUAL WELLNESS VISIT  04/18/2025    HEPATITIS C SCREENING  Completed    COVID-19 Vaccine  Completed    Pneumococcal Vaccine 65+  Completed    ZOSTER VACCINE  Completed                  CMS Preventative Services Quick Reference  Risk Factors Identified During Encounter  Immunizations Discussed/Encouraged: Prevnar 20 (Pneumococcal 20-valent conjugate) and RSV (Respiratory Syncytial Virus)  The above risks/problems have been discussed with the patient.  Pertinent information has been shared with the patient in the After Visit Summary.  An After Visit Summary and PPPS were made available to the patient.    Follow Up:   Next Medicare Wellness visit to be scheduled in 1 year.       Additional E&M Note during same encounter follows:  Patient has multiple medical problems which are significant and separately identifiable that require additional work above and beyond the Medicare Wellness Visit.      Chief Complaint  Rash (Itching Rash on chest and neck x 1 month ) and Medicare Wellness-subsequent    Subjective        HPI  Juan Lima is also being seen today for rash on his face and neck and upper chest.  Fairly symmetric.  Some scaliness.  For few weeks.  Around the beard on the face.    Hypothyroidism He is on levothyroxine 100 mcg a day.  TSH therapeutic 6 months ago.    Hyperlipidemia.  Continue simvastatin 20 mg a day.  Favorable lipid panel with high HDL.    Impaired fasting glucose.  Prediabetic range.  He is working on his diet and exercise.  No progression of diabetes.    Mild anemia.  He states he just gave blood a week or so prior to the lab work.         Objective   Vital Signs:  /72   Pulse 68   Temp 98.2 °F (36.8 °C) (Temporal)   Ht 176.5 cm (69.49\")   Wt 100 kg (220 lb 14.4 oz)   SpO2 98%   BMI 32.16 kg/m²     Physical Exam  Vitals and nursing note reviewed.   Constitutional:       General: He is not in acute distress.     Appearance: He is " well-developed.   Cardiovascular:      Rate and Rhythm: Normal rate and regular rhythm.      Heart sounds: Normal heart sounds.   Pulmonary:      Effort: Pulmonary effort is normal.      Breath sounds: Normal breath sounds.   Skin:     General: Skin is warm and dry.      Findings: Rash present.      Comments: There is a scaly slightly erythematous rash along the bilateral neck into the upper chest.  Also some areas around the beard line.  Consistent with seborrhea or dermatophyte.   Psychiatric:         Mood and Affect: Mood normal.          The following data was reviewed by: Logan Rosario MD on 04/18/2024:  Common labs          4/11/2024    08:44   Common Labs   Glucose 115    BUN 15    Creatinine 1.09    Sodium 138    Potassium 4.4    Chloride 103    Calcium 9.2    Total Protein 6.7    Albumin 4.0    Total Bilirubin 0.8    Alkaline Phosphatase 68    AST (SGOT) 20    ALT (SGPT) 16    WBC 5.59    Hemoglobin 11.6    Hematocrit 36.8    Platelets 199    Total Cholesterol 137    Triglycerides 56    HDL Cholesterol 64    LDL Cholesterol  61    Hemoglobin A1C 6.00      TSH          10/12/2023    08:12   TSH   TSH 2.080                   Assessment and Plan   Diagnoses and all orders for this visit:    1. Medicare annual wellness visit, subsequent (Primary)    2. Hypercholesterolemia  -     Hemoglobin A1c; Future  -     CBC & Differential; Future  -     Comprehensive Metabolic Panel; Future  -     Lipid Panel; Future  -     TSH Rfx On Abnormal To Free T4; Future    3. Impaired fasting glucose  -     Hemoglobin A1c; Future  -     CBC & Differential; Future  -     Comprehensive Metabolic Panel; Future  -     Lipid Panel; Future  -     TSH Rfx On Abnormal To Free T4; Future    4. Adult hypothyroidism  -     Hemoglobin A1c; Future  -     CBC & Differential; Future  -     Comprehensive Metabolic Panel; Future  -     Lipid Panel; Future  -     TSH Rfx On Abnormal To Free T4; Future    5. Encounter for screening  colonoscopy  -     Ambulatory Referral For Screening Colonoscopy    Other orders  -     Pneumococcal Conjugate Vaccine 20-Valent (PCV20)  -     ketoconazole (NIZORAL) 2 % shampoo; Apply  topically to the appropriate area as directed 2 (Two) Times a Week.  Dispense: 120 mL; Refill: 0      Annual Medicare wellness visit.    Immunizations.  RSV and Tdap at retail pharmacy.  Prevnar 20 today.    Colon cancer screening overdue.  Referral placed.    Hyperlipidemia.  Continue simvastatin.    Hypothyroidism.  Continue levothyroxine.  Check TSH prior to next visit.    Impaired fasting glucose/prediabetes.  This time no progression of diabetes.  He will continue to work on his diet and exercise.  Monitor labs in the future.  See me in 6 months.    Rash on neck and face.  Seborrhea versus dermatophyte.  Ketoconazole shampoo for a couple weeks.  If not improving into a dermatologist.         Follow Up   No follow-ups on file.  Patient was given instructions and counseling regarding his condition or for health maintenance advice. Please see specific information pulled into the AVS if appropriate.

## 2024-06-10 RX ORDER — TAMSULOSIN HYDROCHLORIDE 0.4 MG/1
1 CAPSULE ORAL 2 TIMES DAILY
Qty: 180 CAPSULE | Refills: 1 | Status: SHIPPED | OUTPATIENT
Start: 2024-06-10

## 2024-06-10 NOTE — TELEPHONE ENCOUNTER
Rx Refill Note  Requested Prescriptions     Pending Prescriptions Disp Refills    tamsulosin (FLOMAX) 0.4 MG capsule 24 hr capsule 180 capsule 1     Sig: Take 1 capsule by mouth 2 (Two) Times a Day.      Last office visit with prescribing clinician: 4/18/2024   Last telemedicine visit with prescribing clinician: Visit date not found   Next office visit with prescribing clinician: 10/16/2024                         Would you like a call back once the refill request has been completed: [] Yes [] No    If the office needs to give you a call back, can they leave a voicemail: [] Yes [] No    Louise Goetz  06/10/24, 10:54 EDT

## 2024-06-14 RX ORDER — SIMVASTATIN 20 MG
TABLET ORAL
Qty: 90 TABLET | Refills: 1 | Status: SHIPPED | OUTPATIENT
Start: 2024-06-14

## 2024-06-14 RX ORDER — LEVOTHYROXINE SODIUM 0.15 MG/1
150 TABLET ORAL DAILY
Qty: 90 TABLET | Refills: 1 | Status: SHIPPED | OUTPATIENT
Start: 2024-06-14

## 2024-06-14 NOTE — TELEPHONE ENCOUNTER
Rx Refill Note  Requested Prescriptions     Pending Prescriptions Disp Refills    simvastatin (ZOCOR) 20 MG tablet [Pharmacy Med Name: Simvastatin 20 MG Oral Tablet] 90 tablet 1     Sig: TAKE 1 TABLET BY MOUTH AT NIGHT    levothyroxine (SYNTHROID, LEVOTHROID) 150 MCG tablet [Pharmacy Med Name: Levothyroxine Sodium 150 MCG Oral Tablet] 90 tablet 1     Sig: TAKE 1 TABLET BY MOUTH DAILY      Last office visit with prescribing clinician: 4/18/2024   Last telemedicine visit with prescribing clinician: Visit date not found   Next office visit with prescribing clinician: 10/16/2024                         Would you like a call back once the refill request has been completed: [] Yes [] No    If the office needs to give you a call back, can they leave a voicemail: [] Yes [] No    Louise Goetz  06/14/24, 08:29 EDT

## 2024-07-17 ENCOUNTER — PREP FOR SURGERY (OUTPATIENT)
Dept: OTHER | Facility: HOSPITAL | Age: 74
End: 2024-07-17
Payer: MEDICARE

## 2024-07-17 DIAGNOSIS — Z12.11 SCREENING FOR COLON CANCER: Primary | ICD-10-CM

## 2024-07-26 ENCOUNTER — PATIENT MESSAGE (OUTPATIENT)
Dept: FAMILY MEDICINE CLINIC | Facility: CLINIC | Age: 74
End: 2024-07-26
Payer: MEDICARE

## 2024-10-10 PROBLEM — Z12.11 SCREENING FOR COLON CANCER: Status: ACTIVE | Noted: 2024-07-17

## 2024-10-22 RX ORDER — TAMSULOSIN HYDROCHLORIDE 0.4 MG/1
1 CAPSULE ORAL 2 TIMES DAILY
Qty: 180 CAPSULE | Refills: 1 | Status: SHIPPED | OUTPATIENT
Start: 2024-10-22

## 2024-10-22 NOTE — TELEPHONE ENCOUNTER
Rx Refill Note  Requested Prescriptions     Pending Prescriptions Disp Refills    tamsulosin (FLOMAX) 0.4 MG capsule 24 hr capsule [Pharmacy Med Name: Tamsulosin HCl 0.4 MG Oral Capsule] 180 capsule 1     Sig: TAKE 1 CAPSULE BY MOUTH TWICE  DAILY      Last office visit with prescribing clinician: Visit date not found   Last telemedicine visit with prescribing clinician: Visit date not found   Next office visit with prescribing clinician: Visit date not found                         Would you like a call back once the refill request has been completed: [] Yes [] No    If the office needs to give you a call back, can they leave a voicemail: [] Yes [] No    Aishwarya Clark  10/22/24, 10:49 EDT

## 2024-10-30 ENCOUNTER — OFFICE VISIT (OUTPATIENT)
Dept: FAMILY MEDICINE CLINIC | Facility: CLINIC | Age: 74
End: 2024-10-30
Payer: MEDICARE

## 2024-10-30 VITALS
HEIGHT: 69 IN | HEART RATE: 102 BPM | BODY MASS INDEX: 32.04 KG/M2 | OXYGEN SATURATION: 98 % | TEMPERATURE: 97.5 F | WEIGHT: 216.3 LBS | SYSTOLIC BLOOD PRESSURE: 140 MMHG | DIASTOLIC BLOOD PRESSURE: 86 MMHG

## 2024-10-30 DIAGNOSIS — E78.00 HYPERCHOLESTEROLEMIA: Primary | ICD-10-CM

## 2024-10-30 DIAGNOSIS — R73.01 IMPAIRED FASTING GLUCOSE: ICD-10-CM

## 2024-10-30 DIAGNOSIS — E03.9 ADULT HYPOTHYROIDISM: ICD-10-CM

## 2024-10-30 DIAGNOSIS — D64.9 ANEMIA, UNSPECIFIED TYPE: ICD-10-CM

## 2024-10-30 PROCEDURE — 1125F AMNT PAIN NOTED PAIN PRSNT: CPT | Performed by: FAMILY MEDICINE

## 2024-10-30 PROCEDURE — 99214 OFFICE O/P EST MOD 30 MIN: CPT | Performed by: FAMILY MEDICINE

## 2024-10-30 NOTE — PROGRESS NOTES
"Chief Complaint  Hyperlipidemia    Subjective        Juan Lima presents to [facility]  History of Present Illness    Hyperlipidemia follow-up.  Continues simvastatin.  He is on a great job with his diet and exercise.  He is lost some weight.  He is eating less sweets.  He is exercising more.    Hypothyroidism.  Continues on levothyroxine 1 or 50 mcg daily.  His TSH recently is therapeutic.    Impaired fasting glucose.  A1c normalized.  Through diet and exercise.    Elevated blood pressure today.  Previous blood pressure readings normal.    Lower urinary tract symptoms.  Continues on Flomax prescribed by me.  He also continues to follow by urology.    Mild anemia.  He donates blood quarterly.  Normal mean cell volume.  Red cell distribution width slightly high.  Hemoglobin 12.5.  He has a colonoscopy coming up in 2 weeks.  He states he takes iron pills after he donates blood.    Objective   Vital Signs:  /86   Pulse 102   Temp 97.5 °F (36.4 °C) (Temporal)   Ht 176.5 cm (69.49\")   Wt 98.1 kg (216 lb 4.8 oz)   SpO2 98%   BMI 31.49 kg/m²   Estimated body mass index is 31.49 kg/m² as calculated from the following:    Height as of this encounter: 176.5 cm (69.49\").    Weight as of this encounter: 98.1 kg (216 lb 4.8 oz).            Physical Exam  Vitals and nursing note reviewed.   Constitutional:       General: He is not in acute distress.     Appearance: He is well-developed.   Cardiovascular:      Rate and Rhythm: Normal rate and regular rhythm.      Heart sounds: Normal heart sounds.   Pulmonary:      Effort: Pulmonary effort is normal.      Breath sounds: Normal breath sounds.   Skin:     General: Skin is warm and dry.   Psychiatric:         Mood and Affect: Mood normal.        Result Review :  The following data was reviewed by: Logan Rosario MD on 10/30/2024:  Common labs          4/11/2024    08:44 10/23/2024    08:42   Common Labs   Glucose 115  111    BUN 15  15  "   Creatinine 1.09  1.00    Sodium 138  138    Potassium 4.4  4.6    Chloride 103  104    Calcium 9.2  8.8    Total Protein 6.7  6.3    Albumin 4.0  3.9    Total Bilirubin 0.8  0.6    Alkaline Phosphatase 68  65    AST (SGOT) 20  19    ALT (SGPT) 16  12    WBC 5.59  4.87    Hemoglobin 11.6  12.5    Hematocrit 36.8  39.1    Platelets 199  159    Total Cholesterol 137  124    Triglycerides 56  51    HDL Cholesterol 64  59    LDL Cholesterol  61  53    Hemoglobin A1C 6.00  5.30      TSH          10/23/2024    08:57   TSH   TSH 1.800                Assessment and Plan   Diagnoses and all orders for this visit:    1. Hypercholesterolemia (Primary)  -     Hemoglobin A1c; Future  -     Comprehensive Metabolic Panel; Future  -     Lipid Panel; Future  -     Iron; Future  -     Vitamin B12 and Folate; Future  -     Ferritin; Future  -     CBC and Differential; Future  -     Reticulocytes; Future  -     TSH Rfx On Abnormal To Free T4; Future    2. Impaired fasting glucose  -     Hemoglobin A1c; Future  -     Comprehensive Metabolic Panel; Future  -     Lipid Panel; Future  -     Iron; Future  -     Vitamin B12 and Folate; Future  -     Ferritin; Future  -     CBC and Differential; Future  -     Reticulocytes; Future  -     TSH Rfx On Abnormal To Free T4; Future    3. Adult hypothyroidism  -     Hemoglobin A1c; Future  -     Comprehensive Metabolic Panel; Future  -     Lipid Panel; Future  -     Iron; Future  -     Vitamin B12 and Folate; Future  -     Ferritin; Future  -     CBC and Differential; Future  -     Reticulocytes; Future  -     TSH Rfx On Abnormal To Free T4; Future    4. Anemia, unspecified type  -     Hemoglobin A1c; Future  -     Comprehensive Metabolic Panel; Future  -     Lipid Panel; Future  -     Iron; Future  -     Vitamin B12 and Folate; Future  -     Ferritin; Future  -     CBC and Differential; Future  -     Reticulocytes; Future  -     TSH Rfx On Abnormal To Free T4; Future    Hyperlipidemia.  Continue  simvastatin.    Hypothyroidism.  TSH therapeutic.  Continue levothyroxine as is.    Anemia.  Mild.  Normal mean cell volume.  Elevated Red cell distribution width.  Suggestive of mild iron deficiency anemia given his donation of blood quarterly.  Continue same.  Continue over-the-counter iron after donating blood.  I will see him back in 6 months for annual Medicare wellness visit.  Checking anemia panel then.  He has a screening colonoscopy coming up soon.  If anemia is worse at the [place], he will get back to see me.    Impaired fasting glucose.  Much improved.  Continue diet and exercise.  His A1c is normalized.         Follow Up   No follow-ups on file.  Patient was given instructions and counseling regarding his condition or for health maintenance advice. Please see specific information pulled into the AVS if appropriate.

## 2024-11-06 RX ORDER — LEVOTHYROXINE SODIUM 150 UG/1
150 TABLET ORAL DAILY
Qty: 90 TABLET | Refills: 1 | Status: SHIPPED | OUTPATIENT
Start: 2024-11-06

## 2024-11-06 RX ORDER — SIMVASTATIN 20 MG
TABLET ORAL
Qty: 90 TABLET | Refills: 1 | Status: SHIPPED | OUTPATIENT
Start: 2024-11-06

## 2024-11-14 ENCOUNTER — ANESTHESIA (OUTPATIENT)
Dept: GASTROENTEROLOGY | Facility: HOSPITAL | Age: 74
End: 2024-11-14
Payer: MEDICARE

## 2024-11-14 ENCOUNTER — ANESTHESIA EVENT (OUTPATIENT)
Dept: GASTROENTEROLOGY | Facility: HOSPITAL | Age: 74
End: 2024-11-14
Payer: MEDICARE

## 2024-11-14 ENCOUNTER — HOSPITAL ENCOUNTER (OUTPATIENT)
Facility: HOSPITAL | Age: 74
Setting detail: HOSPITAL OUTPATIENT SURGERY
Discharge: HOME | End: 2024-11-14
Attending: SURGERY | Admitting: SURGERY
Payer: MEDICARE

## 2024-11-14 VITALS
SYSTOLIC BLOOD PRESSURE: 128 MMHG | OXYGEN SATURATION: 100 % | HEART RATE: 63 BPM | HEIGHT: 70 IN | BODY MASS INDEX: 29.92 KG/M2 | WEIGHT: 209 LBS | RESPIRATION RATE: 20 BRPM | DIASTOLIC BLOOD PRESSURE: 83 MMHG

## 2024-11-14 DIAGNOSIS — Z12.11 SCREENING FOR COLON CANCER: ICD-10-CM

## 2024-11-14 PROBLEM — K63.5 COLON POLYPS: Status: ACTIVE | Noted: 2024-11-14

## 2024-11-14 PROBLEM — K57.90 DIVERTICULOSIS: Status: ACTIVE | Noted: 2024-11-14

## 2024-11-14 LAB
QT INTERVAL: 413 MS
QTC INTERVAL: 522 MS

## 2024-11-14 PROCEDURE — 25010000002 PROPOFOL 1000 MG/100ML EMULSION: Performed by: ANESTHESIOLOGY

## 2024-11-14 PROCEDURE — 25810000003 LACTATED RINGERS PER 1000 ML: Performed by: SURGERY

## 2024-11-14 PROCEDURE — 45380 COLONOSCOPY AND BIOPSY: CPT | Performed by: SURGERY

## 2024-11-14 PROCEDURE — 25010000002 LIDOCAINE 2% SOLUTION: Performed by: ANESTHESIOLOGY

## 2024-11-14 PROCEDURE — S0260 H&P FOR SURGERY: HCPCS | Performed by: SURGERY

## 2024-11-14 PROCEDURE — 88305 TISSUE EXAM BY PATHOLOGIST: CPT | Performed by: SURGERY

## 2024-11-14 PROCEDURE — 93005 ELECTROCARDIOGRAM TRACING: CPT | Performed by: ANESTHESIOLOGY

## 2024-11-14 PROCEDURE — 93010 ELECTROCARDIOGRAM REPORT: CPT | Performed by: INTERNAL MEDICINE

## 2024-11-14 PROCEDURE — 45385 COLONOSCOPY W/LESION REMOVAL: CPT | Performed by: SURGERY

## 2024-11-14 RX ORDER — LIDOCAINE HYDROCHLORIDE 20 MG/ML
INJECTION, SOLUTION INFILTRATION; PERINEURAL AS NEEDED
Status: DISCONTINUED | OUTPATIENT
Start: 2024-11-14 | End: 2024-11-14 | Stop reason: SURG

## 2024-11-14 RX ORDER — SODIUM CHLORIDE, SODIUM LACTATE, POTASSIUM CHLORIDE, CALCIUM CHLORIDE 600; 310; 30; 20 MG/100ML; MG/100ML; MG/100ML; MG/100ML
20 INJECTION, SOLUTION INTRAVENOUS CONTINUOUS
Status: DISCONTINUED | OUTPATIENT
Start: 2024-11-14 | End: 2024-11-14 | Stop reason: HOSPADM

## 2024-11-14 RX ORDER — PROPOFOL 10 MG/ML
INJECTION, EMULSION INTRAVENOUS AS NEEDED
Status: DISCONTINUED | OUTPATIENT
Start: 2024-11-14 | End: 2024-11-14 | Stop reason: SURG

## 2024-11-14 RX ORDER — ASPIRIN 81 MG/1
81 TABLET ORAL DAILY
Start: 2024-11-17

## 2024-11-14 RX ADMIN — LIDOCAINE HYDROCHLORIDE 100 MG: 20 INJECTION, SOLUTION INFILTRATION; PERINEURAL at 11:41

## 2024-11-14 RX ADMIN — PROPOFOL INJECTABLE EMULSION 200 MG: 10 INJECTION, EMULSION INTRAVENOUS at 11:43

## 2024-11-14 RX ADMIN — SODIUM CHLORIDE, POTASSIUM CHLORIDE, SODIUM LACTATE AND CALCIUM CHLORIDE 20 ML/HR: 600; 310; 30; 20 INJECTION, SOLUTION INTRAVENOUS at 10:13

## 2024-11-14 RX ADMIN — PROPOFOL INJECTABLE EMULSION 100 MG: 10 INJECTION, EMULSION INTRAVENOUS at 11:41

## 2024-11-14 NOTE — H&P
General Surgery  History and Physical    CC: Screening for colon cancer    HPI: The patient is a pleasant 74 y.o. year-old gentleman who presents today for a routine screening colonoscopy.  His last colonoscopy was done around 2017 in [place].  He does not recall any history of colon polyps being found.  He has no family history of colon cancer and denies any melena or hematochezia.    Past Medical History:   Graves' disease  History of benign thymus tumor  Osteoarthritis  Atrial flutter    Past Surgical History:   Thymectomy  Thyroidectomy  Right inguinal hernia repair  Bilateral cataract extraction  Colonoscopy    Medications:   Medications Prior to Admission   Medication Sig Dispense Refill Last Dose/Taking    aspirin (aspirin) 81 MG EC tablet Take 1 tablet by mouth Daily. (Patient taking differently: Take 1 tablet by mouth Daily. NONE PAST WEEK) 90 tablet 3 11/7/2024    CBD (cannabidiol) oral oil Take 1 drop by mouth Daily. HOLD FOR PROCEDURE   11/12/2024    Glucosamine-Chondroitin--200-150 MG tablet Take 2 tablets by mouth Daily.   11/13/2024    Inulin (FIBER CHOICE PO) Take 2 tablets by mouth Daily.   11/12/2024    levothyroxine (SYNTHROID, LEVOTHROID) 150 MCG tablet TAKE 1 TABLET BY MOUTH DAILY 90 tablet 1 11/13/2024    Multiple Vitamins-Minerals (MULTIVITAMIN PO) Take 1 tablet by mouth Daily.   11/12/2024    oxybutynin XL (DITROPAN-XL) 5 MG 24 hr tablet Take 1 tablet by mouth Every Evening.   11/12/2024    Saw Palmetto 1000 MG capsule Take 1 capsule by mouth Daily. HELD   11/13/2024    simvastatin (ZOCOR) 20 MG tablet TAKE 1 TABLET BY MOUTH AT NIGHT 90 tablet 1 11/12/2024    tamsulosin (FLOMAX) 0.4 MG capsule 24 hr capsule TAKE 1 CAPSULE BY MOUTH TWICE  DAILY (Patient taking differently: Take 1 capsule by mouth Every Evening.) 180 capsule 1 11/12/2024       Allergies: No known drug allergies    Family History: Mother with history of breast cancer, no family history of gastrointestinal  malignancy    Social History: , non-smoker, social alcohol use    ROS: A comprehensive review of systems was conducted and negative for melena or hematochezia  All other systems reviewed and negative    Physical Exam:  Vitals:    11/14/24 0931   BP: 136/88   Pulse: 94   Resp: 16   SpO2: 97%   Height: 177 cm  Weight: 94 kg  BMI: 29.9  General: No acute distress, well-nourished & well-developed  HEAD: normocephalic, atraumatic  EYES: normal conjunctiva, sclera anicteric  EARS: grossly normal hearing  NECK: supple, no thyromegaly  CARDIOVASCULAR: regular rate and rhythm  RESPIRATORY: clear to auscultation bilaterally  GASTROINTESTINAL: soft, nontender, non-distended  PSYCHIATRIC: oriented x3, normal mood and affect    ASSESSMENT & PLAN  Mr. Lima is a 74-year-old gentleman here for routine screening colonoscopy.  He has been counseled on the risks of the procedure which include bleeding, colon perforation, and missed pathology.  Despite these risks, he has consented to proceed.    Mitzi Haji MD  General, Robotic, and Endoscopic Surgery  [facility]    [address], [address]  [place] [address]  P: [phone]  F: [phone]

## 2024-11-14 NOTE — ANESTHESIA POSTPROCEDURE EVALUATION
"Patient: Juan Lima    Procedure Summary       Date: 11/14/24 Room / Location: [facility] ENDOSCOPY 8 / [facility] ENDOSCOPY    Anesthesia Start: 1137 Anesthesia Stop: 1206    Procedure: COLONOSCOPY to cecum with cold and hot polypectomies Diagnosis:       Screening for colon cancer      (Screening for colon cancer [Z12.11])    Surgeons: Mitzi Haji MD Provider: Lloyd Alatorre MD    Anesthesia Type: MAC ASA Status: 3            Anesthesia Type: MAC    Vitals  Vitals Value Taken Time   /83 11/14/24 1233   Temp     Pulse 76 11/14/24 1236   Resp 20 11/14/24 1232   SpO2 97 % 11/14/24 1236   Vitals shown include unfiled device data.        Post Anesthesia Care and Evaluation    Patient location during evaluation: bedside  Patient participation: complete - patient participated  Level of consciousness: awake and alert  Pain management: adequate    Airway patency: patent  Anesthetic complications: No anesthetic complications    Cardiovascular status: acceptable  Respiratory status: acceptable  Hydration status: acceptable    Comments: /83 (BP Location: Left arm, Patient Position: Lying)   Pulse 63   Resp 20   Ht 177.8 cm (70\")   Wt 94.8 kg (209 lb)   SpO2 100%   BMI 29.99 kg/m²     "

## 2024-11-14 NOTE — ANESTHESIA PREPROCEDURE EVALUATION
Anesthesia Evaluation     Patient summary reviewed and Nursing notes reviewed   NPO Solid Status: > 8 hours             Airway   Mallampati: II  TM distance: >3 FB  Neck ROM: full  no difficulty expected  Dental - normal exam     Pulmonary - normal exam   Cardiovascular - normal exam    (+) dysrhythmias Atrial Flutter, hyperlipidemia      Neuro/Psych  GI/Hepatic/Renal/Endo    (+) liver disease, thyroid problem     Musculoskeletal     Abdominal  - normal exam   Substance History      OB/GYN          Other   arthritis,                 Anesthesia Plan    ASA 3     MAC     intravenous induction     Anesthetic plan, risks, benefits, and alternatives have been provided, discussed and informed consent has been obtained with: patient.    CODE STATUS:

## 2024-11-14 NOTE — OP NOTE
Operative Note :  Mitzi Haji MD      Juan Lima  1950    Procedure Date: 11/14/24    Pre-op Diagnosis:  Screening for colon cancer [Z12.11]    Post-Operative Diagnosis:  Colon polyps  Diverticulosis    Procedure:   Flexible colonoscopy to the cecum with hot snare polypectomy and cold forcep polypectomy    Surgeon: Mitzi Haji MD    Assistant: None    Anesthesia:  MAC (monitored anesthetic care)    Estimated Blood Loss: Minimal    Specimens:   Transverse colon polyp  Ascending colon polyp    Complications: None    Indications:  Mr. Lima is a 74-year-old gentleman here for routine screening colonoscopy. He has been counseled on the risks of the procedure which include bleeding, colon perforation, and missed pathology. Despite these risks, he has consented to proceed.     Findings: 2 sessile colon polyps removed, sigmoid diverticulosis    Description of procedure:  The patient was brought to the endoscopy suite and bogdan in the left lateral decubitus position.  Continuous propofol anesthesia was administered.  A surgical timeout was completed.  A digital rectal exam was performed, revealing no abnormalities.  An adult colonoscope was then inserted through the anus and passed under direct visualization to the level of the cecum.  The cecum was identified via the ileocecal valve as well as the appendiceal orifice.  The scope was then slowly withdrawn, examining all circumferential walls of the ascending, transverse, descending, and sigmoid colon.  On the way in towards the cecum, a 7 mm sessile polyp was identified within the transverse colon and removed using the biopsy forceps.  Within the proximal ascending colon there was a 5 mm polyp removed using hot snare.  He had diverticulosis of the sigmoid colon with no evidence of bleeding or fecal impaction.  Within the rectum the scope was retroflexed and showed no evidence of hemorrhoidal disease.  The scope was then withdrawn and the colon  desufflated.  The patient had a very good bowel prep and was transferred to the recovery area in stable condition.     Recommendations:  I will call the patient in 1 week or less with the pathology results of the 2 polyps removed as this will determine when the next screening colonoscopy will be due.    Mitzi Haji MD  General, Robotic, and Endoscopic Surgery  [facility]    [address], [address]  [place] [address]  P: [phone]  F: [phone]

## 2024-11-14 NOTE — NURSING NOTE
EKG AT BEDSIDE. EKG SHOWS AFLUTTER. DR. DAVIS NOTIFIED, REQUESTS PATIENT SEE CARDIOLOGY AS OUTPATIENT. NO NEW ORDERS AT THIS TIME. MD OK TO PROCEED WITH COLONOSCOPY TODAY.

## 2024-11-15 LAB
CYTO UR: NORMAL
LAB AP CASE REPORT: NORMAL
PATH REPORT.FINAL DX SPEC: NORMAL
PATH REPORT.GROSS SPEC: NORMAL

## 2024-11-22 ENCOUNTER — OFFICE VISIT (OUTPATIENT)
Dept: CARDIOLOGY | Facility: CLINIC | Age: 74
End: 2024-11-22
Payer: MEDICARE

## 2024-11-22 VITALS
SYSTOLIC BLOOD PRESSURE: 128 MMHG | WEIGHT: 211 LBS | HEIGHT: 70 IN | BODY MASS INDEX: 30.21 KG/M2 | HEART RATE: 65 BPM | OXYGEN SATURATION: 99 % | DIASTOLIC BLOOD PRESSURE: 76 MMHG

## 2024-11-22 DIAGNOSIS — I48.3 TYPICAL ATRIAL FLUTTER: Primary | Chronic | ICD-10-CM

## 2024-11-22 PROBLEM — R03.0 ELEVATED BLOOD PRESSURE READING: Chronic | Status: RESOLVED | Noted: 2019-10-10 | Resolved: 2024-11-22

## 2024-11-22 PROBLEM — I10 PRIMARY HYPERTENSION: Status: RESOLVED | Noted: 2024-11-22 | Resolved: 2024-11-22

## 2024-11-22 PROBLEM — I10 PRIMARY HYPERTENSION: Status: ACTIVE | Noted: 2024-11-22

## 2024-11-22 NOTE — ASSESSMENT & PLAN NOTE
Patient baseline rhythm is atrial flutter with controlled rate.  Patient is asymptomatic, preserved LVEF on echocardiogram  Patient exercises without any exertional symptoms  No need for AV blocker as patient rate is controlled  Patient taking a baby aspirin only for CHADS2 vascular score of 1

## 2024-11-22 NOTE — PROGRESS NOTES
"    CARDIOLOGY        Patient Name: Juan Lima  :1950  Age: 74 y.o.  Primary Cardiologist: John Portillo MD  Encounter Provider:  TOM Denise    Date of Service: 2024      CHIEF COMPLAINT / REASON FOR OFFICE VISIT     Follow-up and Atrial Flutter      HISTORY OF PRESENT ILLNESS       HPI  Juan Lima is a 74 y.o. male who presents today for semiannual evaluation.     Pt has a  history significant for asymptomatic typical a flutter with rate control, hyperlipidemia..    Patient was first established in our clinic in  for evaluation of incidental finding of atrial flutter as part of a preop exam.  At that time patient had an echocardiogram which revealed preserved LVEF.    Patient reports that he has done well since last assessment.  He reports that he exercises in the form of walking 30-40 minutes 4 to 5 days/week.  Patient had a colonoscopy last week where he had a ECG.  I have personally reviewed ECG tracings dated 2024.  My interpretation is typical a flutter with rate controlled and no ischemic changes.  Patient is asymptomatic.    The following portions of the patient's history were reviewed and updated as appropriate: allergies, current medications, past family history, past medical history, past social history, past surgical history and problem list.      VITAL SIGNS     Visit Vitals  /76   Pulse 65   Ht 177.8 cm (70\")   Wt 95.7 kg (211 lb)   SpO2 99%   BMI 30.28 kg/m²         Wt Readings from Last 3 Encounters:   24 95.7 kg (211 lb)   24 94.8 kg (209 lb)   10/30/24 98.1 kg (216 lb 4.8 oz)     Body mass index is 30.28 kg/m².      REVIEW OF SYSTEMS     Review of Systems   Constitutional: Negative for chills, fever, weight gain and weight loss.   Cardiovascular:  Negative for leg swelling.   Respiratory:  Negative for cough, snoring and wheezing.    Hematologic/Lymphatic: Negative for bleeding problem. Does not bruise/bleed easily.   Skin:  " Negative for color change.   Musculoskeletal:  Negative for falls, joint pain and myalgias.   Gastrointestinal:  Negative for melena.   Genitourinary:  Negative for hematuria.   Neurological:  Negative for excessive daytime sleepiness.   Psychiatric/Behavioral:  Negative for depression. The patient is not nervous/anxious.            PHYSICAL EXAMINATION     Constitutional:       Appearance: Normal appearance. Well-developed.   Eyes:      Conjunctiva/sclera: Conjunctivae normal.   Neck:      Vascular: No carotid bruit.   Pulmonary:      Effort: Pulmonary effort is normal.      Breath sounds: Normal breath sounds.   Cardiovascular:      Normal rate. Regular rhythm. Normal S1. Normal S2.       Murmurs: There is no murmur.      No gallop.  No click. No rub.   Edema:     Peripheral edema absent.   Musculoskeletal: Normal range of motion. Skin:     General: Skin is warm and dry.   Neurological:      Mental Status: Alert and oriented to person, place, and time.      GCS: GCS eye subscore is 4. GCS verbal subscore is 5. GCS motor subscore is 6.   Psychiatric:         Speech: Speech normal.         Behavior: Behavior normal.         Thought Content: Thought content normal.         Judgment: Judgment normal.           REVIEWED DATA     Procedures    Cardiac Procedures:  Echocardiogram 1/15/2021.  LVEF 51%.  Diastolic function indeterminate.    Lipid Panel          4/11/2024    08:44 10/23/2024    08:42   Lipid Panel   Total Cholesterol 137  124    Triglycerides 56  51    HDL Cholesterol 64  59    VLDL Cholesterol 12  12    LDL Cholesterol  61  53        Lab Results   Component Value Date     10/23/2024     04/11/2024    K 4.6 10/23/2024    K 4.4 04/11/2024     10/23/2024     04/11/2024    CO2 26.6 10/23/2024    CO2 25.7 04/11/2024    BUN 15 10/23/2024    BUN 15 04/11/2024    CREATININE 1.00 10/23/2024    CREATININE 1.09 04/11/2024    EGFRIFNONA 85 07/28/2021    EGFRIFNONA 72 12/23/2020    EGFRIFAFRI  "103 07/28/2021    EGFRIFAFRI 87 12/23/2020    GLUCOSE 111 (H) 10/23/2024    GLUCOSE 115 (H) 04/11/2024    CALCIUM 8.8 10/23/2024    CALCIUM 9.2 04/11/2024    PROTENTOTREF 6.3 10/23/2024    PROTENTOTREF 6.7 04/11/2024    ALBUMIN 3.9 10/23/2024    ALBUMIN 4.0 04/11/2024    BILITOT 0.6 10/23/2024    BILITOT 0.8 04/11/2024    AST 19 10/23/2024    AST 20 04/11/2024    ALT 12 10/23/2024    ALT 16 04/11/2024     Lab Results   Component Value Date    WBC 4.87 10/23/2024    WBC 5.59 04/11/2024    HGB 12.5 (L) 10/23/2024    HGB 11.6 (L) 04/11/2024    HCT 39.1 10/23/2024    HCT 36.8 (L) 04/11/2024    MCV 91.1 10/23/2024    MCV 85.0 04/11/2024     10/23/2024     04/11/2024     No results found for: \"PROBNP\", \"BNP\"  No results found for: \"CKTOTAL\", \"CKMB\", \"CKMBINDEX\", \"TROPONINI\", \"TROPONINT\"  Lab Results   Component Value Date    TSH 1.800 10/23/2024    TSH 2.080 10/12/2023             ASSESSMENT & PLAN     Diagnoses and all orders for this visit:    1. Typical atrial flutter (Primary)  Assessment & Plan:  Patient baseline rhythm is atrial flutter with controlled rate.  Patient is asymptomatic, preserved LVEF on echocardiogram  Patient exercises without any exertional symptoms  No need for AV blocker as patient rate is controlled  Patient taking a baby aspirin only for CHADS2 vascular score of 1          Return in about 1 year (around 11/22/2025) for  Routine.    Future Appointments         Provider Department [place]    4/23/2025 8:00 AM [facility] PRIMARY CARE [facility]    4/30/2025 8:45 AM Logan Rosario MD [facility] PRIMARY CARE PAT    11/25/2025 12:45 PM John Portillo MD [facility] CARDIOLOGY PAT              MEDICATIONS         Discharge Medications            Accurate as of November 22, 2024 12:39 PM. If you have any questions, ask your nurse or doctor.                Changes to Medications        Instructions Start Date "   tamsulosin 0.4 MG capsule 24 hr capsule  Commonly known as: FLOMAX  What changed: when to take this   0.4 mg, Oral, 2 Times Daily             Continue These Medications        Instructions Start Date   aspirin 81 MG EC tablet   81 mg, Oral, Daily      CBD oral oil  Commonly known as: cannabidiol   1 drop, Daily      FIBER CHOICE PO   2 tablets, Daily      Glucosamine-Chondroitin--200-150 MG tablet   2 tablets, Daily      levothyroxine 150 MCG tablet  Commonly known as: SYNTHROID, LEVOTHROID   150 mcg, Oral, Daily      multivitamin tablet tablet  Commonly known as: THERAGRAN   1 tablet, Daily      oxybutynin XL 5 MG 24 hr tablet  Commonly known as: DITROPAN-XL   5 mg, Every Evening      Saw Palmetto 1000 MG capsule   1,000 mg, Daily      simvastatin 20 MG tablet  Commonly known as: ZOCOR   TAKE 1 TABLET BY MOUTH AT NIGHT                   **Dragon Disclaimer:   Much of this encounter note is an electronic transcription/translation of spoken language to printed text. The electronic translation of spoken language may permit erroneous, or at times, nonsensical words or phrases to be inadvertently transcribed. Although I have reviewed the note for such errors, some may still exist.

## 2024-11-25 ENCOUNTER — TELEPHONE (OUTPATIENT)
Dept: SURGERY | Facility: CLINIC | Age: 74
End: 2024-11-25
Payer: MEDICARE

## 2024-11-25 NOTE — TELEPHONE ENCOUNTER
I called Juan and discussed the benign pathology findings from his colonoscopy.  Both polyps returned as benign tubular adenomas which are inherently precancerous.  I explained that polypectomy is curative.  I would typically recommend he needs a repeat screening colonoscopy in 5 years but due to his age of 74 years, he would be 79 years old at that point.  The risks probably would outweigh the benefits beyond age 78, and therefore he does not require any further screening colonoscopies. He expressed understanding.

## 2024-12-26 ENCOUNTER — TELEPHONE (OUTPATIENT)
Dept: FAMILY MEDICINE CLINIC | Facility: CLINIC | Age: 74
End: 2024-12-26

## 2024-12-26 NOTE — TELEPHONE ENCOUNTER
Caller: Valery Lima    Relationship: Emergency Contact    Best call back number: [phone]     Who are you requesting to speak with (clinical staff, provider,  specific staff member): CLINICAL     What was the call regarding: PATIENT'S WIFE STATES THAT HE HAS HAD A SEVERE, DEEP COUGH FOR ABOUT 3 DAYS. HE HAS A TENDENCY TO GET BRONCHITIS, AND SHE DOES NOT WANT THIS TO GO FURTHER. NO SAME DAY VISITS IN OFFICE. PLEASE CALL AND ADVISE

## 2024-12-27 ENCOUNTER — OFFICE VISIT (OUTPATIENT)
Dept: FAMILY MEDICINE CLINIC | Facility: CLINIC | Age: 74
End: 2024-12-27
Payer: MEDICARE

## 2024-12-27 VITALS
RESPIRATION RATE: 18 BRPM | TEMPERATURE: 98.6 F | BODY MASS INDEX: 30.21 KG/M2 | HEIGHT: 70 IN | DIASTOLIC BLOOD PRESSURE: 70 MMHG | SYSTOLIC BLOOD PRESSURE: 118 MMHG | HEART RATE: 107 BPM | WEIGHT: 211 LBS | OXYGEN SATURATION: 97 %

## 2024-12-27 DIAGNOSIS — J11.1 INFLUENZA: Primary | ICD-10-CM

## 2024-12-27 DIAGNOSIS — R05.1 ACUTE COUGH: ICD-10-CM

## 2024-12-27 DIAGNOSIS — R51.9 SINUS HEADACHE: ICD-10-CM

## 2024-12-27 LAB
EXPIRATION DATE: ABNORMAL
FLUAV AG UPPER RESP QL IA.RAPID: DETECTED
FLUBV AG UPPER RESP QL IA.RAPID: NOT DETECTED
INTERNAL CONTROL: ABNORMAL
Lab: ABNORMAL
SARS-COV-2 AG UPPER RESP QL IA.RAPID: NOT DETECTED

## 2024-12-27 PROCEDURE — 99213 OFFICE O/P EST LOW 20 MIN: CPT | Performed by: FAMILY MEDICINE

## 2024-12-27 PROCEDURE — 1125F AMNT PAIN NOTED PAIN PRSNT: CPT | Performed by: FAMILY MEDICINE

## 2024-12-27 PROCEDURE — 87428 SARSCOV & INF VIR A&B AG IA: CPT | Performed by: FAMILY MEDICINE

## 2024-12-27 RX ORDER — AMOXICILLIN AND CLAVULANATE POTASSIUM 500; 125 MG/1; MG/1
1 TABLET, FILM COATED ORAL 2 TIMES DAILY
Qty: 20 TABLET | Refills: 0 | Status: SHIPPED | OUTPATIENT
Start: 2024-12-27 | End: 2025-01-06

## 2024-12-27 RX ORDER — OSELTAMIVIR PHOSPHATE 75 MG/1
75 CAPSULE ORAL 2 TIMES DAILY
Qty: 10 CAPSULE | Refills: 0 | Status: SHIPPED | OUTPATIENT
Start: 2024-12-27

## 2024-12-27 RX ORDER — HYDROCODONE BITARTRATE AND HOMATROPINE METHYLBROMIDE ORAL SOLUTION 5; 1.5 MG/5ML; MG/5ML
5 LIQUID ORAL EVERY 6 HOURS PRN
Qty: 150 ML | Refills: 0 | Status: SHIPPED | OUTPATIENT
Start: 2024-12-27

## 2024-12-27 RX ORDER — BENZONATATE 200 MG/1
200 CAPSULE ORAL 3 TIMES DAILY PRN
Qty: 30 CAPSULE | Refills: 1 | Status: SHIPPED | OUTPATIENT
Start: 2024-12-27

## 2024-12-27 NOTE — PROGRESS NOTES
"Chief Complaint  Chief Complaint   Patient presents with    Cough     Pt c/o cough, sinus headache x 4 days       Subjective    History of Present Illness        Juan Lima presents to [facility]IMARY [facility]ARE for   Cough  This is a new problem. The current episode started in the past 7 days. The problem occurs constantly. The cough is Dry. Associated symptoms include headaches, myalgias and nasal congestion. Treatments tried: mucinex and antihistamine and robatussin.      History of Present Illness      Objective   Vital Signs:   Visit Vitals  /70   Pulse 107   Temp 98.6 °F (37 °C) (Oral)   Resp 18   Ht 177.8 cm (70\")   Wt 95.7 kg (211 lb)   SpO2 97%   BMI 30.28 kg/m²          BMI is >= 30 and <35. (Class 1 Obesity). The following options were offered after discussion;: exercise counseling/recommendations and nutrition counseling/recommendations     Physical Exam  Vitals reviewed.   Constitutional:       Appearance: He is well-developed.   HENT:      Head: Normocephalic.      Right Ear: External ear normal.      Left Ear: External ear normal.      Nose: Nose normal.   Eyes:      Conjunctiva/sclera: Conjunctivae normal.   Cardiovascular:      Rate and Rhythm: Normal rate and regular rhythm.   Pulmonary:      Effort: Pulmonary effort is normal.      Breath sounds: Normal breath sounds.   Musculoskeletal:         General: Normal range of motion.      Cervical back: Normal range of motion and neck supple.   Skin:     General: Skin is warm and dry.      Capillary Refill: Capillary refill takes less than 2 seconds.   Neurological:      Mental Status: He is alert and oriented to person, place, and time.        Physical Exam           Result Review :  Results                            Assessment and Plan      Diagnoses and all orders for this visit:    1. Influenza (Primary)  Assessment & Plan:  Discussed viral URI's, cause, typical course and treatment options. Discussed that antibiotics do not " shorten the duration of viral illnesses. Nasal saline/suction bulb, cool mist humidifier, postural drainage discussed in office today.  Reviewed s/s needing further investigation and those for which to present to ER.    Orders:  -     oseltamivir (TAMIFLU) 75 MG capsule; Take 1 capsule by mouth 2 (Two) Times a Day.  Dispense: 10 capsule; Refill: 0    2. Sinus headache  Assessment & Plan:  he was prescribed Augmentin, Tessalon Perles and Hycodan to treat his symptoms of a sinus infection.    Increase fluids. Tylenol/motrin for pain or fever.   Medication and medication adverse effects discussed.    Follow-up 5-7 days for reevaluation if not improved or sooner if needed.      Orders:  -     POCT SARS-CoV-2 + Flu Antigen MARNIE  -     amoxicillin-clavulanate (Augmentin) 500-125 MG per tablet; Take 1 tablet by mouth 2 (Two) Times a Day for 10 days.  Dispense: 20 tablet; Refill: 0    3. Acute cough  -     POCT SARS-CoV-2 + Flu Antigen MARNIE  -     HYDROcodone Bit-Homatrop MBr (HYCODAN) 5-1.5 MG/5ML solution; Take 5 mL by mouth Every 6 (Six) Hours As Needed for Cough.  Dispense: 150 mL; Refill: 0  -     benzonatate (TESSALON) 200 MG capsule; Take 1 capsule by mouth 3 (Three) Times a Day As Needed for Cough.  Dispense: 30 capsule; Refill: 1       Assessment & Plan             Follow Up   No follow-ups on file.  Patient was given instructions and counseling regarding his condition or for health maintenance advice. Please see specific information pulled into the AVS if appropriate.

## 2025-01-13 PROBLEM — J11.1 INFLUENZA: Status: ACTIVE | Noted: 2025-01-13

## 2025-01-13 PROBLEM — R51.9 SINUS HEADACHE: Status: ACTIVE | Noted: 2025-01-13

## 2025-01-13 NOTE — ASSESSMENT & PLAN NOTE
he was prescribed Augmentin, Tessalon Perles and Hycodan to treat his symptoms of a sinus infection.    Increase fluids. Tylenol/motrin for pain or fever.   Medication and medication adverse effects discussed.    Follow-up 5-7 days for reevaluation if not improved or sooner if needed.

## 2025-04-02 RX ORDER — TAMSULOSIN HYDROCHLORIDE 0.4 MG/1
1 CAPSULE ORAL 2 TIMES DAILY
Qty: 180 CAPSULE | Refills: 1 | Status: SHIPPED | OUTPATIENT
Start: 2025-04-02

## 2025-04-02 NOTE — TELEPHONE ENCOUNTER
Rx Refill Note  Requested Prescriptions     Pending Prescriptions Disp Refills    tamsulosin (FLOMAX) 0.4 MG capsule 24 hr capsule [Pharmacy Med Name: Tamsulosin HCl 0.4 MG Oral Capsule] 180 capsule 1     Sig: TAKE 1 CAPSULE BY MOUTH TWICE  DAILY      Last office visit with prescribing clinician: 10/30/2024   Last telemedicine visit with prescribing clinician: Visit date not found   Next office visit with prescribing clinician: 4/30/2025                         Would you like a call back once the refill request has been completed: [] Yes [] No    If the office needs to give you a call back, can they leave a voicemail: [] Yes [] No    Louise Goetz  04/02/25, 08:14 EDT

## 2025-04-17 RX ORDER — LEVOTHYROXINE SODIUM 150 UG/1
150 TABLET ORAL DAILY
Qty: 90 TABLET | Refills: 1 | Status: SHIPPED | OUTPATIENT
Start: 2025-04-17

## 2025-04-17 RX ORDER — SIMVASTATIN 20 MG
20 TABLET ORAL
Qty: 90 TABLET | Refills: 1 | Status: SHIPPED | OUTPATIENT
Start: 2025-04-17

## 2025-04-17 NOTE — TELEPHONE ENCOUNTER
Rx Refill Note  Requested Prescriptions     Pending Prescriptions Disp Refills    simvastatin (ZOCOR) 20 MG tablet [Pharmacy Med Name: Simvastatin 20 MG Oral Tablet] 90 tablet 1     Sig: TAKE 1 TABLET BY MOUTH AT NIGHT    levothyroxine (SYNTHROID, LEVOTHROID) 150 MCG tablet [Pharmacy Med Name: Levothyroxine Sodium 150 MCG Oral Tablet] 90 tablet 1     Sig: TAKE 1 TABLET BY MOUTH DAILY      Last office visit with prescribing clinician: Visit date not found   Last telemedicine visit with prescribing clinician: Visit date not found   Next office visit with prescribing clinician: Visit date not found                         Would you like a call back once the refill request has been completed: [] Yes [] No    If the office needs to give you a call back, can they leave a voicemail: [] Yes [] No    Maricel Lopes MA  04/17/25, 07:38 EDT

## 2025-04-30 ENCOUNTER — OFFICE VISIT (OUTPATIENT)
Dept: FAMILY MEDICINE CLINIC | Facility: CLINIC | Age: 75
End: 2025-04-30
Payer: MEDICARE

## 2025-04-30 VITALS
WEIGHT: 209.3 LBS | BODY MASS INDEX: 29.96 KG/M2 | RESPIRATION RATE: 17 BRPM | SYSTOLIC BLOOD PRESSURE: 120 MMHG | DIASTOLIC BLOOD PRESSURE: 82 MMHG | OXYGEN SATURATION: 97 % | HEIGHT: 70 IN | HEART RATE: 88 BPM

## 2025-04-30 DIAGNOSIS — E78.79 FAMILIAL HYPERCHOLANEMIA: Primary | ICD-10-CM

## 2025-04-30 DIAGNOSIS — D64.9 ANEMIA, UNSPECIFIED TYPE: ICD-10-CM

## 2025-04-30 DIAGNOSIS — R73.01 IMPAIRED FASTING GLUCOSE: Chronic | ICD-10-CM

## 2025-04-30 DIAGNOSIS — R39.9 LOWER URINARY TRACT SYMPTOMS (LUTS): Chronic | ICD-10-CM

## 2025-04-30 DIAGNOSIS — E03.9 ADULT HYPOTHYROIDISM: Chronic | ICD-10-CM

## 2025-04-30 NOTE — PROGRESS NOTES
"Chief Complaint  Hyperlipidemia    Subjective        Juan Lima presents to [facility]  History of Present Illness    Hypothyroidism.  TSH is slightly elevated but T4 levels normal.  No symptomatology.    Atrial flutter history.  Follows with cardiology.  They are recommending aspirin only.    Hyperlipidemia.  Continues on simvastatin.  HDL greater than LDL.    Impaired fasting glucose.  Still in a prediabetic potential level.  However his A1c remains completely normal.  He has a mild iron deficiency anemia from donating blood.  This could be affecting the A1c level.  However he maintains his level of exercise and heart healthy diet.  And he is lost some weight through diet and exercise.    Objective   Vital Signs:  /82   Pulse 88   Resp 17   Ht 177.8 cm (70\")   Wt 94.9 kg (209 lb 4.8 oz)   SpO2 97%   BMI 30.03 kg/m²   Estimated body mass index is 30.03 kg/m² as calculated from the following:    Height as of this encounter: 177.8 cm (70\").    Weight as of this encounter: 94.9 kg (209 lb 4.8 oz).            Physical Exam  Vitals and nursing note reviewed.   Constitutional:       General: He is not in acute distress.     Appearance: He is well-developed.   Cardiovascular:      Rate and Rhythm: Normal rate. Rhythm irregular.      Heart sounds: Normal heart sounds.   Pulmonary:      Effort: Pulmonary effort is normal.      Breath sounds: Normal breath sounds.   Skin:     General: Skin is warm and dry.   Psychiatric:         Mood and Affect: Mood normal.        Result Review :  The following data was reviewed by: Logan Rosario MD on 04/30/2025:  Common labs          10/23/2024    08:42 4/23/2025    08:16   Common Labs   Glucose 111  113    BUN 15  13    Creatinine 1.00  1.04    Sodium 138  138    Potassium 4.6  4.4    Chloride 104  105    Calcium 8.8  8.9    Albumin 3.9  3.9    Total Bilirubin 0.6  0.6    Alkaline Phosphatase 65  67    AST (SGOT) 19  16    ALT (SGPT) 12  " 12    WBC 4.87  4.10    Hemoglobin 12.5  11.8    Hematocrit 39.1  35.9    Platelets 159  161    Total Cholesterol 124  121    Triglycerides 51  63    HDL Cholesterol 59  56    LDL Cholesterol  53  51    Hemoglobin A1C 5.30  5.60      TSH          10/23/2024    08:57 4/23/2025    08:16   TSH   TSH 1.800  5.070                Assessment and Plan   Diagnoses and all orders for this visit:    1. Familial hypercholanemia (Primary)  -     Hemoglobin A1c; Future  -     CBC & Differential; Future  -     Comprehensive Metabolic Panel; Future  -     Lipid Panel; Future  -     TSH+Free T4; Future  -     Iron and TIBC; Future  -     Ferritin; Future    2. Adult hypothyroidism  -     Hemoglobin A1c; Future  -     CBC & Differential; Future  -     Comprehensive Metabolic Panel; Future  -     Lipid Panel; Future  -     TSH+Free T4; Future  -     Iron and TIBC; Future  -     Ferritin; Future    3. Impaired fasting glucose  -     Hemoglobin A1c; Future  -     CBC & Differential; Future  -     Comprehensive Metabolic Panel; Future  -     Lipid Panel; Future  -     TSH+Free T4; Future  -     Iron and TIBC; Future  -     Ferritin; Future    4. Lower urinary tract symptoms (LUTS)  -     Hemoglobin A1c; Future  -     CBC & Differential; Future  -     Comprehensive Metabolic Panel; Future  -     Lipid Panel; Future  -     TSH+Free T4; Future  -     Iron and TIBC; Future  -     Ferritin; Future    5. Anemia, unspecified type  -     Iron and TIBC; Future  -     Ferritin; Future    Other orders  -     Cancel: ECG 12 Lead    Hyperlipidemia.  Continue simvastatin.    Atrial flutter.  [id number] score of 2 by my calculation.  The cardiologist is recommending aspirin only.  It is rate controlled without a beta-blocker.  Patient will follow-up with his cardiologist as scheduled.    Hypothyroidism.  TSH minimally elevated.  T4 normal.  Recheck 6 months.    Iron deficiency anemia related to donating blood.  No bleeding episodes.  Colonoscopy  unremarkable a few months ago.  He donated blood about a week prior to the CBC.  His iron level is slightly low.  I recommend taking iron tablets daily for 2 weeks after donating blood.    Follow-up 6 months.    Lower urinary tract symptoms.  Continues tamsulosin.  No hypotensive symptomatology.             Follow Up   No follow-ups on file.  Patient was given instructions and counseling regarding his condition or for health maintenance advice. Please see specific information pulled into the AVS if appropriate.

## 2025-06-03 ENCOUNTER — OFFICE VISIT (OUTPATIENT)
Dept: FAMILY MEDICINE CLINIC | Facility: CLINIC | Age: 75
End: 2025-06-03
Payer: MEDICARE

## 2025-06-03 VITALS
HEIGHT: 70 IN | TEMPERATURE: 98.5 F | SYSTOLIC BLOOD PRESSURE: 112 MMHG | BODY MASS INDEX: 30.03 KG/M2 | OXYGEN SATURATION: 98 % | HEART RATE: 102 BPM | RESPIRATION RATE: 17 BRPM | DIASTOLIC BLOOD PRESSURE: 82 MMHG

## 2025-06-03 DIAGNOSIS — J06.9 ACUTE URI: Primary | ICD-10-CM

## 2025-06-03 LAB
EXPIRATION DATE: ABNORMAL
FLUAV AG UPPER RESP QL IA.RAPID: NOT DETECTED
FLUBV AG UPPER RESP QL IA.RAPID: NOT DETECTED
INTERNAL CONTROL: ABNORMAL
Lab: ABNORMAL
SARS-COV-2 AG UPPER RESP QL IA.RAPID: DETECTED

## 2025-06-03 PROCEDURE — 99213 OFFICE O/P EST LOW 20 MIN: CPT | Performed by: FAMILY MEDICINE

## 2025-06-03 PROCEDURE — 1125F AMNT PAIN NOTED PAIN PRSNT: CPT | Performed by: FAMILY MEDICINE

## 2025-06-03 PROCEDURE — 87428 SARSCOV & INF VIR A&B AG IA: CPT | Performed by: FAMILY MEDICINE

## 2025-06-03 NOTE — PROGRESS NOTES
"Chief Complaint  Covid Positive    Subjective        Juan Lima presents to [facility]  History of Present Illness    URI symptoms 36 hours.  Positive COVID test at home.  Fully vaccinated.  Previous COVID.  Some loss of smell.  Paroxysmal cough at times.  Vomited times once.  No shortness of breath.  No high fever.  Recently was traveling in [place].    Objective   Vital Signs:  /82   Pulse 102   Temp 98.5 °F (36.9 °C) (Oral)   Resp 17   Ht 177.8 cm (70\")   SpO2 98%   BMI 30.03 kg/m²   Estimated body mass index is 30.03 kg/m² as calculated from the following:    Height as of this encounter: 177.8 cm (70\").    Weight as of 4/30/25: 94.9 kg (209 lb 4.8 oz).            Physical Exam  Vitals and nursing note reviewed.   Constitutional:       General: He is not in acute distress.     Comments: No acute distress.  Nontoxic.   Eyes:      General:         Right eye: No discharge.         Left eye: No discharge.   Cardiovascular:      Rate and Rhythm: Normal rate.   Pulmonary:      Effort: Pulmonary effort is normal. No respiratory distress.      Breath sounds: Normal breath sounds. No stridor. No wheezing or rales.   Lymphadenopathy:      Cervical: No cervical adenopathy.   Skin:     Findings: No rash.        Result Review :                Assessment and Plan   Diagnoses and all orders for this visit:    1. Acute URI (Primary)  -     POCT SARS-CoV-2 + Flu Antigen MARNIE    Other orders  -     Nirmatrelvir & Ritonavir, 300mg/100mg, (PAXLOVID); Take 3 tablets by mouth 2 (Two) Times a Day for 5 days. Do not take simvastatin or tamsulosin for ten days  Dispense: 30 tablet; Refill: 0      COVID-19 acute URI syndrome.  Catching it early.  Reasonable candidate for Paxlovid.  We discussed the pluses and minuses.  Discussed FDA emergency use authorization.  May reduce risk of hospitalization.  May improve symptoms.  Side effects including vomiting discussed.  Has potential drug-drug " interactions.  He is going to hold his simvastatin and tamsulosin for 10 days while taking the Paxlovid over the next 5 days.  With severe symptoms he will seek medical attention.       Follow Up   No follow-ups on file.  Patient was given instructions and counseling regarding his condition or for health maintenance advice. Please see specific information pulled into the AVS if appropriate.

## (undated) DEVICE — SNAR POLYP SENSATION STDOVL 27 240 BX40

## (undated) DEVICE — SINGLE-USE BIOPSY FORCEPS: Brand: RADIAL JAW 4

## (undated) DEVICE — TUBING, SUCTION, 1/4" X 10', STRAIGHT: Brand: MEDLINE

## (undated) DEVICE — ADAPT CLN BIOGUARD AIR/H2O DISP

## (undated) DEVICE — SENSR O2 OXIMAX FNGR A/ 18IN NONSTR

## (undated) DEVICE — KT ORCA ORCAPOD DISP STRL

## (undated) DEVICE — CANN O2 ETCO2 FITS ALL CONN CO2 SMPL A/ 7IN DISP LF

## (undated) DEVICE — THE SINGLE USE ETRAP – POLYP TRAP IS USED FOR SUCTION RETRIEVAL OF ENDOSCOPICALLY REMOVED POLYPS.: Brand: ETRAP

## (undated) DEVICE — GOWN,SIRUS,NON REINFRCD,LARGE,SET IN SL: Brand: MEDLINE